# Patient Record
Sex: MALE | Race: WHITE | NOT HISPANIC OR LATINO | Employment: OTHER | URBAN - METROPOLITAN AREA
[De-identification: names, ages, dates, MRNs, and addresses within clinical notes are randomized per-mention and may not be internally consistent; named-entity substitution may affect disease eponyms.]

---

## 2021-05-28 ENCOUNTER — RECORDS - HEALTHEAST (OUTPATIENT)
Dept: ADMINISTRATIVE | Facility: CLINIC | Age: 61
End: 2021-05-28

## 2023-04-30 ENCOUNTER — HOSPITAL ENCOUNTER (INPATIENT)
Facility: CLINIC | Age: 63
LOS: 7 days | Discharge: HOME | DRG: 501 | End: 2023-05-07
Attending: EMERGENCY MEDICINE | Admitting: HOSPITALIST
Payer: COMMERCIAL

## 2023-04-30 ENCOUNTER — APPOINTMENT (OUTPATIENT)
Dept: GENERAL RADIOLOGY | Facility: CLINIC | Age: 63
DRG: 501 | End: 2023-04-30
Attending: EMERGENCY MEDICINE
Payer: COMMERCIAL

## 2023-04-30 DIAGNOSIS — M00.9 PYOGENIC ARTHRITIS OF LEFT SHOULDER REGION, DUE TO UNSPECIFIED ORGANISM (H): ICD-10-CM

## 2023-04-30 LAB
ANION GAP SERPL CALCULATED.3IONS-SCNC: 11 MMOL/L (ref 7–15)
BASOPHILS # BLD AUTO: 0 10E3/UL (ref 0–0.2)
BASOPHILS NFR BLD AUTO: 0 %
BUN SERPL-MCNC: 17.1 MG/DL (ref 8–23)
CALCIUM SERPL-MCNC: 9.2 MG/DL (ref 8.8–10.2)
CHLORIDE SERPL-SCNC: 98 MMOL/L (ref 98–107)
CREAT SERPL-MCNC: 0.95 MG/DL (ref 0.67–1.17)
CRP SERPL-MCNC: 127.71 MG/L
DEPRECATED HCO3 PLAS-SCNC: 29 MMOL/L (ref 22–29)
EOSINOPHIL # BLD AUTO: 0 10E3/UL (ref 0–0.7)
EOSINOPHIL NFR BLD AUTO: 0 %
ERYTHROCYTE [DISTWIDTH] IN BLOOD BY AUTOMATED COUNT: 13.6 % (ref 10–15)
ERYTHROCYTE [SEDIMENTATION RATE] IN BLOOD BY WESTERGREN METHOD: 25 MM/HR (ref 0–20)
GFR SERPL CREATININE-BSD FRML MDRD: 89 ML/MIN/1.73M2
GLUCOSE SERPL-MCNC: 104 MG/DL (ref 70–99)
HCT VFR BLD AUTO: 44.5 % (ref 40–53)
HGB BLD-MCNC: 14.6 G/DL (ref 13.3–17.7)
HOLD SPECIMEN: NORMAL
IMM GRANULOCYTES # BLD: 0.1 10E3/UL
IMM GRANULOCYTES NFR BLD: 0 %
LACTATE SERPL-SCNC: 0.6 MMOL/L (ref 0.7–2)
LACTATE SERPL-SCNC: 1.1 MMOL/L (ref 0.7–2)
LYMPHOCYTES # BLD AUTO: 1.4 10E3/UL (ref 0.8–5.3)
LYMPHOCYTES NFR BLD AUTO: 12 %
MCH RBC QN AUTO: 29.1 PG (ref 26.5–33)
MCHC RBC AUTO-ENTMCNC: 32.8 G/DL (ref 31.5–36.5)
MCV RBC AUTO: 89 FL (ref 78–100)
MONOCYTES # BLD AUTO: 1.3 10E3/UL (ref 0–1.3)
MONOCYTES NFR BLD AUTO: 11 %
NEUTROPHILS # BLD AUTO: 9.1 10E3/UL (ref 1.6–8.3)
NEUTROPHILS NFR BLD AUTO: 77 %
NRBC # BLD AUTO: 0 10E3/UL
NRBC BLD AUTO-RTO: 0 /100
PLATELET # BLD AUTO: 301 10E3/UL (ref 150–450)
POTASSIUM SERPL-SCNC: 3.8 MMOL/L (ref 3.4–5.3)
RBC # BLD AUTO: 5.02 10E6/UL (ref 4.4–5.9)
SODIUM SERPL-SCNC: 138 MMOL/L (ref 136–145)
WBC # BLD AUTO: 11.9 10E3/UL (ref 4–11)

## 2023-04-30 PROCEDURE — 86140 C-REACTIVE PROTEIN: CPT | Performed by: EMERGENCY MEDICINE

## 2023-04-30 PROCEDURE — 120N000001 HC R&B MED SURG/OB

## 2023-04-30 PROCEDURE — 83605 ASSAY OF LACTIC ACID: CPT | Performed by: EMERGENCY MEDICINE

## 2023-04-30 PROCEDURE — 73030 X-RAY EXAM OF SHOULDER: CPT | Mod: LT

## 2023-04-30 PROCEDURE — 80048 BASIC METABOLIC PNL TOTAL CA: CPT | Performed by: EMERGENCY MEDICINE

## 2023-04-30 PROCEDURE — 99222 1ST HOSP IP/OBS MODERATE 55: CPT | Performed by: HOSPITALIST

## 2023-04-30 PROCEDURE — 250N000013 HC RX MED GY IP 250 OP 250 PS 637: Performed by: HOSPITALIST

## 2023-04-30 PROCEDURE — 36415 COLL VENOUS BLD VENIPUNCTURE: CPT | Performed by: EMERGENCY MEDICINE

## 2023-04-30 PROCEDURE — 85652 RBC SED RATE AUTOMATED: CPT | Performed by: EMERGENCY MEDICINE

## 2023-04-30 PROCEDURE — 99285 EMERGENCY DEPT VISIT HI MDM: CPT | Mod: 25

## 2023-04-30 PROCEDURE — 85025 COMPLETE CBC W/AUTO DIFF WBC: CPT | Performed by: EMERGENCY MEDICINE

## 2023-04-30 PROCEDURE — 87040 BLOOD CULTURE FOR BACTERIA: CPT | Performed by: EMERGENCY MEDICINE

## 2023-04-30 RX ORDER — ONDANSETRON 4 MG/1
4 TABLET, ORALLY DISINTEGRATING ORAL EVERY 6 HOURS PRN
Status: DISCONTINUED | OUTPATIENT
Start: 2023-04-30 | End: 2023-05-07 | Stop reason: HOSPADM

## 2023-04-30 RX ORDER — LIDOCAINE 40 MG/G
CREAM TOPICAL
Status: DISCONTINUED | OUTPATIENT
Start: 2023-04-30 | End: 2023-05-07 | Stop reason: HOSPADM

## 2023-04-30 RX ORDER — NALOXONE HYDROCHLORIDE 0.4 MG/ML
0.4 INJECTION, SOLUTION INTRAMUSCULAR; INTRAVENOUS; SUBCUTANEOUS
Status: DISCONTINUED | OUTPATIENT
Start: 2023-04-30 | End: 2023-05-07 | Stop reason: HOSPADM

## 2023-04-30 RX ORDER — AMOXICILLIN 250 MG
2 CAPSULE ORAL 2 TIMES DAILY PRN
Status: DISCONTINUED | OUTPATIENT
Start: 2023-04-30 | End: 2023-05-07 | Stop reason: HOSPADM

## 2023-04-30 RX ORDER — ACETAMINOPHEN 500 MG
1000 TABLET ORAL EVERY 6 HOURS PRN
Status: ON HOLD | COMMUNITY
End: 2023-05-05

## 2023-04-30 RX ORDER — IBUPROFEN 200 MG
400 TABLET ORAL EVERY 6 HOURS PRN
COMMUNITY

## 2023-04-30 RX ORDER — AMOXICILLIN 250 MG
1 CAPSULE ORAL 2 TIMES DAILY PRN
Status: DISCONTINUED | OUTPATIENT
Start: 2023-04-30 | End: 2023-05-07 | Stop reason: HOSPADM

## 2023-04-30 RX ORDER — ONDANSETRON 2 MG/ML
4 INJECTION INTRAMUSCULAR; INTRAVENOUS EVERY 6 HOURS PRN
Status: DISCONTINUED | OUTPATIENT
Start: 2023-04-30 | End: 2023-05-07 | Stop reason: HOSPADM

## 2023-04-30 RX ORDER — ACETAMINOPHEN 650 MG/1
650 SUPPOSITORY RECTAL EVERY 6 HOURS PRN
Status: DISCONTINUED | OUTPATIENT
Start: 2023-04-30 | End: 2023-05-07 | Stop reason: HOSPADM

## 2023-04-30 RX ORDER — ASPIRIN 81 MG/1
81 TABLET ORAL DAILY
Status: ON HOLD | COMMUNITY
End: 2023-05-05

## 2023-04-30 RX ORDER — NALOXONE HYDROCHLORIDE 0.4 MG/ML
0.2 INJECTION, SOLUTION INTRAMUSCULAR; INTRAVENOUS; SUBCUTANEOUS
Status: DISCONTINUED | OUTPATIENT
Start: 2023-04-30 | End: 2023-05-07 | Stop reason: HOSPADM

## 2023-04-30 RX ORDER — LANOLIN ALCOHOL/MO/W.PET/CERES
3 CREAM (GRAM) TOPICAL
Status: DISCONTINUED | OUTPATIENT
Start: 2023-04-30 | End: 2023-05-07 | Stop reason: HOSPADM

## 2023-04-30 RX ORDER — ACETAMINOPHEN 325 MG/1
650 TABLET ORAL EVERY 6 HOURS PRN
Status: DISCONTINUED | OUTPATIENT
Start: 2023-04-30 | End: 2023-05-07 | Stop reason: HOSPADM

## 2023-04-30 RX ADMIN — HYDROMORPHONE HYDROCHLORIDE 1 MG: 2 TABLET ORAL at 23:17

## 2023-04-30 RX ADMIN — ACETAMINOPHEN 650 MG: 325 TABLET ORAL at 23:17

## 2023-04-30 ASSESSMENT — ACTIVITIES OF DAILY LIVING (ADL)
ADLS_ACUITY_SCORE: 35

## 2023-04-30 ASSESSMENT — ENCOUNTER SYMPTOMS
WOUND: 1
JOINT SWELLING: 1
ARTHRALGIAS: 1
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
## 2023-04-30 NOTE — ED PROVIDER NOTES
"  History     Chief Complaint:  Shoulder Pain       HPI   Simone Mercedes is a 63 year old male with a history of osteoarthritis who presents with shoulder pain. The patient states that 5 years ago he was seen by orthopedics and was told that he may need a left shoulder replacement. He opted for physical therapy and cupping and has had limited mobility in the shoulder for years. He also has a drainage on the back of the left shoulder that he can feel and hs been there for three years and never heals. The patient then just this week developed more swelling and pain in the shoulder. He was taking a nap this after noon and he had a redness and warmth to his underarm which scared him and he wanted to come in. He has a meeting with orthopedics tomorrow to get the shoulder replaced but wanted to rule out a septic joint because he had one in the past.      Independent Historian:   None - Patient Only    Review of External Notes: na     ROS:  Review of Systems   Musculoskeletal: Positive for arthralgias and joint swelling.   Skin: Positive for rash and wound.   All other systems reviewed and are negative.      Allergies:  No Known Allergies     Medications:    The patient is currently on no regular medications.    Past Medical History:    Osteoarthritis    Past Surgical History:    Femur ORIF  Right knee replacement  Rotator cuff repair     Family History:    Father-Alcohol abuse  Mother-Arthritis  Sister-Alcohol abuse    Social History:  The patient presents to the ED with his granddaughter.    Physical Exam     Patient Vitals for the past 24 hrs:   BP Temp Temp src Pulse Resp SpO2 Height Weight   04/30/23 2030 (!) 157/92 -- -- -- -- 95 % -- --   04/30/23 1927 (!) 157/94 -- -- 90 18 95 % 1.854 m (6' 1\") 83.9 kg (185 lb)   04/30/23 1800 (!) 144/102 -- -- 87 18 95 % -- --   04/30/23 1740 (!) 161/104 -- -- 91 -- 95 % -- --   04/30/23 1634 (!) 186/96 98.4  F (36.9  C) Temporal 114 16 97 % -- 82 kg (180 lb 12.4 oz)    "     Physical Exam  GENERAL: well developed, pleasant  HEAD: atraumatic  EYES: pupils reactive, extraocular muscles intact, conjunctivae normal  ENT:  mucus membranes moist  NECK:  trachea midline, normal range of motion  RESPIRATORY: no tachypnea, breath sounds clear to auscultation   CVS: normal S1/S2, no murmurs, intact distal pulses  ABDOMEN: soft, nontender, nondistention  MUSCULOSKELETAL: no deformities, limited range of motion to the left shoulder  SKIN: warm and dry, Redness and warmth to the left underarm as seen in photo. Wound in left posterior shoulder as seen in photo  NEURO: GCS 15, cranial nerves intact, alert and oriented x3  PSYCH:  Mood/affect normal                Emergency Department Course     Imaging:  XR Shoulder Left G/E 3 Views   Final Result   IMPRESSION:       Moderate to severe glenohumeral joint space narrowing with areas of sclerosis in the humeral head with adjacent areas of lucency, which could reflect sequelae of chronic degenerative changes, however the acuity cannot be determined and underlying    infection cannot be excluded. Significant narrowing of the acromiohumeral interval suggesting underlying rotator cuff tear.         NOTE: ABNORMAL REPORT      THE DICTATION ABOVE DESCRIBES AN ABNORMALITY FOR WHICH FOLLOW-UP IS NEEDED.           Report per radiology    Laboratory:  Labs Ordered and Resulted from Time of ED Arrival to Time of ED Departure   BASIC METABOLIC PANEL - Abnormal       Result Value    Sodium 138      Potassium 3.8      Chloride 98      Carbon Dioxide (CO2) 29      Anion Gap 11      Urea Nitrogen 17.1      Creatinine 0.95      Calcium 9.2      Glucose 104 (*)     GFR Estimate 89     CBC WITH PLATELETS AND DIFFERENTIAL - Abnormal    WBC Count 11.9 (*)     RBC Count 5.02      Hemoglobin 14.6      Hematocrit 44.5      MCV 89      MCH 29.1      MCHC 32.8      RDW 13.6      Platelet Count 301      % Neutrophils 77      % Lymphocytes 12      % Monocytes 11      %  Eosinophils 0      % Basophils 0      % Immature Granulocytes 0      NRBCs per 100 WBC 0      Absolute Neutrophils 9.1 (*)     Absolute Lymphocytes 1.4      Absolute Monocytes 1.3      Absolute Eosinophils 0.0      Absolute Basophils 0.0      Absolute Immature Granulocytes 0.1      Absolute NRBCs 0.0     CRP INFLAMMATION - Abnormal    CRP Inflammation 127.71 (*)    ERYTHROCYTE SEDIMENTATION RATE AUTO - Abnormal    Erythrocyte Sedimentation Rate 25 (*)    LACTIC ACID WHOLE BLOOD - Abnormal    Lactic Acid 0.6 (*)    LACTIC ACID WHOLE BLOOD - Normal    Lactic Acid 1.1     BLOOD CULTURE   BLOOD CULTURE      Emergency Department Course & Assessments:    Assessments:  1810 Obtained the patients history and performed initial exam  2100 Rechecked the patient and updated him on findings    Independent Interpretation (X-rays, CTs, rhythm strip):  Xray showing no dislocation    Consultations/Discussion of Management or Tests:  ED Course as of 04/30/23 2110   Sun Apr 30, 2023 2055 Spoke to orthopedics about the patients findings and next steps.   2109 I spoke to the hospitalist, Dr. Martinez, who has agreed to admit the patient for continued evaluation and treatment.       Social Determinants of Health affecting care:   None    Disposition:  The patient was admitted to the hospital under the care of Dr. Martinez.     Impression & Plan      Medical Decision Making:  Patient presents with worsening chronic left shoulder pain with prior infection numerous years ago.  He notes 1 week of symptoms and limited ROM.  He has redness to left arm pit with warmth and erythema.  He also notes a chronic wound/opening to posterior left arm/shoulder that he sometimes squeezes in the shower and blood or pus comes out.  His inflammatory markers are elevated.  Cultures are obtained.  I spoke with orthopedics who suggested IR tap, hold antibiotics unless septic appearing and admission.  IR drain for septic joint was ordered.  Spoke with  hospitalist for admission.    Diagnosis:    ICD-10-CM    1. Pyogenic arthritis of left shoulder region, due to unspecified organism (H)  M00.9            Scribe Disclosure:  I, Devon Rodriguez, am serving as a scribe at 6:06 PM on 4/30/2023 to document services personally performed by Nick Laura MD based on my observations and the provider's statements to me.     4/30/2023   Nick Laura MD Adams, Shaun L, MD  05/01/23 0029

## 2023-05-01 ENCOUNTER — APPOINTMENT (OUTPATIENT)
Dept: ULTRASOUND IMAGING | Facility: CLINIC | Age: 63
DRG: 501 | End: 2023-05-01
Attending: PHYSICIAN ASSISTANT
Payer: COMMERCIAL

## 2023-05-01 ENCOUNTER — APPOINTMENT (OUTPATIENT)
Dept: GENERAL RADIOLOGY | Facility: CLINIC | Age: 63
DRG: 501 | End: 2023-05-01
Attending: PHYSICIAN ASSISTANT
Payer: COMMERCIAL

## 2023-05-01 LAB
ALBUMIN SERPL BCG-MCNC: 3.8 G/DL (ref 3.5–5.2)
ALP SERPL-CCNC: 49 U/L (ref 40–129)
ALT SERPL W P-5'-P-CCNC: 16 U/L (ref 10–50)
ANION GAP SERPL CALCULATED.3IONS-SCNC: 16 MMOL/L (ref 7–15)
AST SERPL W P-5'-P-CCNC: 16 U/L (ref 10–50)
BILIRUB SERPL-MCNC: 0.4 MG/DL
BUN SERPL-MCNC: 10.1 MG/DL (ref 8–23)
CALCIUM SERPL-MCNC: 9 MG/DL (ref 8.8–10.2)
CHLORIDE SERPL-SCNC: 101 MMOL/L (ref 98–107)
CREAT SERPL-MCNC: 0.81 MG/DL (ref 0.67–1.17)
DEPRECATED HCO3 PLAS-SCNC: 23 MMOL/L (ref 22–29)
ERYTHROCYTE [DISTWIDTH] IN BLOOD BY AUTOMATED COUNT: 13.6 % (ref 10–15)
GFR SERPL CREATININE-BSD FRML MDRD: >90 ML/MIN/1.73M2
GLUCOSE SERPL-MCNC: 95 MG/DL (ref 70–99)
GRAM STAIN RESULT: NORMAL
GRAM STAIN RESULT: NORMAL
HCT VFR BLD AUTO: 43.7 % (ref 40–53)
HGB BLD-MCNC: 14.4 G/DL (ref 13.3–17.7)
MCH RBC QN AUTO: 29.4 PG (ref 26.5–33)
MCHC RBC AUTO-ENTMCNC: 33 G/DL (ref 31.5–36.5)
MCV RBC AUTO: 89 FL (ref 78–100)
PLATELET # BLD AUTO: 270 10E3/UL (ref 150–450)
POTASSIUM SERPL-SCNC: 3.8 MMOL/L (ref 3.4–5.3)
PROT SERPL-MCNC: 7 G/DL (ref 6.4–8.3)
RBC # BLD AUTO: 4.9 10E6/UL (ref 4.4–5.9)
SODIUM SERPL-SCNC: 140 MMOL/L (ref 136–145)
WBC # BLD AUTO: 10.2 10E3/UL (ref 4–11)

## 2023-05-01 PROCEDURE — 99232 SBSQ HOSP IP/OBS MODERATE 35: CPT | Performed by: STUDENT IN AN ORGANIZED HEALTH CARE EDUCATION/TRAINING PROGRAM

## 2023-05-01 PROCEDURE — 77002 NEEDLE LOCALIZATION BY XRAY: CPT

## 2023-05-01 PROCEDURE — 80053 COMPREHEN METABOLIC PANEL: CPT | Performed by: HOSPITALIST

## 2023-05-01 PROCEDURE — 255N000002 HC RX 255 OP 636: Performed by: HOSPITALIST

## 2023-05-01 PROCEDURE — 250N000011 HC RX IP 250 OP 636: Performed by: STUDENT IN AN ORGANIZED HEALTH CARE EDUCATION/TRAINING PROGRAM

## 2023-05-01 PROCEDURE — 250N000013 HC RX MED GY IP 250 OP 250 PS 637: Performed by: STUDENT IN AN ORGANIZED HEALTH CARE EDUCATION/TRAINING PROGRAM

## 2023-05-01 PROCEDURE — 36415 COLL VENOUS BLD VENIPUNCTURE: CPT | Performed by: HOSPITALIST

## 2023-05-01 PROCEDURE — 20611 DRAIN/INJ JOINT/BURSA W/US: CPT | Mod: LT

## 2023-05-01 PROCEDURE — 250N000013 HC RX MED GY IP 250 OP 250 PS 637: Performed by: HOSPITALIST

## 2023-05-01 PROCEDURE — 120N000001 HC R&B MED SURG/OB

## 2023-05-01 PROCEDURE — 250N000009 HC RX 250: Performed by: HOSPITALIST

## 2023-05-01 PROCEDURE — 250N000011 HC RX IP 250 OP 636: Performed by: HOSPITALIST

## 2023-05-01 PROCEDURE — 258N000003 HC RX IP 258 OP 636: Performed by: HOSPITALIST

## 2023-05-01 PROCEDURE — 87015 SPECIMEN INFECT AGNT CONCNTJ: CPT | Performed by: PHYSICIAN ASSISTANT

## 2023-05-01 PROCEDURE — 250N000009 HC RX 250: Performed by: PHYSICIAN ASSISTANT

## 2023-05-01 PROCEDURE — 87205 SMEAR GRAM STAIN: CPT | Performed by: PHYSICIAN ASSISTANT

## 2023-05-01 PROCEDURE — 0R9K3ZX DRAINAGE OF LEFT SHOULDER JOINT, PERCUTANEOUS APPROACH, DIAGNOSTIC: ICD-10-PCS | Performed by: PHYSICIAN ASSISTANT

## 2023-05-01 PROCEDURE — 89050 BODY FLUID CELL COUNT: CPT | Performed by: PHYSICIAN ASSISTANT

## 2023-05-01 PROCEDURE — 87075 CULTR BACTERIA EXCEPT BLOOD: CPT | Performed by: PHYSICIAN ASSISTANT

## 2023-05-01 PROCEDURE — 85027 COMPLETE CBC AUTOMATED: CPT | Performed by: HOSPITALIST

## 2023-05-01 RX ORDER — VANCOMYCIN HYDROCHLORIDE 1 G/200ML
1000 INJECTION, SOLUTION INTRAVENOUS EVERY 12 HOURS
Status: DISCONTINUED | OUTPATIENT
Start: 2023-05-02 | End: 2023-05-03

## 2023-05-01 RX ORDER — IOPAMIDOL 408 MG/ML
10 INJECTION, SOLUTION INTRATHECAL ONCE
Status: COMPLETED | OUTPATIENT
Start: 2023-05-01 | End: 2023-05-01

## 2023-05-01 RX ORDER — LIDOCAINE HYDROCHLORIDE 10 MG/ML
10 INJECTION, SOLUTION EPIDURAL; INFILTRATION; INTRACAUDAL; PERINEURAL ONCE
Status: COMPLETED | OUTPATIENT
Start: 2023-05-01 | End: 2023-05-01

## 2023-05-01 RX ORDER — TRAMADOL HYDROCHLORIDE 50 MG/1
50 TABLET ORAL EVERY 6 HOURS PRN
Status: DISCONTINUED | OUTPATIENT
Start: 2023-05-01 | End: 2023-05-03

## 2023-05-01 RX ORDER — CEFTRIAXONE 2 G/1
2 INJECTION, POWDER, FOR SOLUTION INTRAMUSCULAR; INTRAVENOUS EVERY 24 HOURS
Status: DISCONTINUED | OUTPATIENT
Start: 2023-05-01 | End: 2023-05-05

## 2023-05-01 RX ADMIN — ACETAMINOPHEN 650 MG: 325 TABLET ORAL at 06:46

## 2023-05-01 RX ADMIN — LIDOCAINE HYDROCHLORIDE 5 ML: 10 INJECTION, SOLUTION EPIDURAL; INFILTRATION; INTRACAUDAL; PERINEURAL at 11:53

## 2023-05-01 RX ADMIN — IOPAMIDOL 8 ML: 408 INJECTION, SOLUTION INTRATHECAL at 12:26

## 2023-05-01 RX ADMIN — TRAMADOL HYDROCHLORIDE 50 MG: 50 TABLET, COATED ORAL at 16:43

## 2023-05-01 RX ADMIN — VANCOMYCIN HYDROCHLORIDE 2000 MG: 10 INJECTION, POWDER, LYOPHILIZED, FOR SOLUTION INTRAVENOUS at 19:44

## 2023-05-01 RX ADMIN — LIDOCAINE HYDROCHLORIDE 5 ML: 10 INJECTION, SOLUTION EPIDURAL; INFILTRATION; INTRACAUDAL; PERINEURAL at 12:36

## 2023-05-01 RX ADMIN — HYDROMORPHONE HYDROCHLORIDE 1 MG: 2 TABLET ORAL at 03:32

## 2023-05-01 RX ADMIN — HYDROMORPHONE HYDROCHLORIDE 1 MG: 2 TABLET ORAL at 08:03

## 2023-05-01 RX ADMIN — CEFTRIAXONE SODIUM 2 G: 2 INJECTION, POWDER, FOR SOLUTION INTRAMUSCULAR; INTRAVENOUS at 16:43

## 2023-05-01 RX ADMIN — ACETAMINOPHEN 650 MG: 325 TABLET ORAL at 19:44

## 2023-05-01 ASSESSMENT — ACTIVITIES OF DAILY LIVING (ADL)
DOING_ERRANDS_INDEPENDENTLY_DIFFICULTY: NO
ADLS_ACUITY_SCORE: 18
WEAR_GLASSES_OR_BLIND: NO
ADLS_ACUITY_SCORE: 18
ADLS_ACUITY_SCORE: 18
CHANGE_IN_FUNCTIONAL_STATUS_SINCE_ONSET_OF_CURRENT_ILLNESS/INJURY: NO
ADLS_ACUITY_SCORE: 18
ADLS_ACUITY_SCORE: 18
FALL_HISTORY_WITHIN_LAST_SIX_MONTHS: NO
WALKING_OR_CLIMBING_STAIRS_DIFFICULTY: NO
ADLS_ACUITY_SCORE: 18
ADLS_ACUITY_SCORE: 18
CONCENTRATING,_REMEMBERING_OR_MAKING_DECISIONS_DIFFICULTY: NO
ADLS_ACUITY_SCORE: 18
TOILETING_ISSUES: NO
ADLS_ACUITY_SCORE: 18
DRESSING/BATHING_DIFFICULTY: NO
DIFFICULTY_EATING/SWALLOWING: NO

## 2023-05-01 NOTE — PLAN OF CARE
Goal Outcome Evaluation:          Pt A&Ox4, VSS on room air, left shoulder pain managed with PRN Dilaudid, PRN Tylenol and Ice. Pain with movement. NPO since mindnight. Ambulates with stand by assist, voiding adequately.

## 2023-05-01 NOTE — PROGRESS NOTES
RADIOLOGY PROCEDURE NOTE  Patient name: Simone Merceeds  MRN: [id number]  : 1960    Pre-procedure diagnosis: Left shoulder pain and swelling  Post-procedure diagnosis: Same    Procedure Date/Time: May 1, 2023  12:54 PM  Procedure: Left shoulder aspiration under XR guidance.  Irrigation needed to get a sample.  Minimal collected.  Contrast showed no apparent communication to the lateral shoulder ulceration.  Took the patient to ultrasound and then scanned the area of the ulceration showing an area of possible fluid collection.  This area was irrigated and a sample was collected as well.  With pressure of the posterior shoulder, bloody fluid was expressed from the ulceration.  Estimated blood loss: None  Specimen(s) collected with description: 2-3 ml of fluid after irrigation from the glenohumeral joint.  1-2 ml of fluid collected after irrigation within the pocket lateral to the deltoid  The patient tolerated the procedure well with no immediate complications.  Significant findings:none    See imaging dictation for procedural details.    Provider name: Javi Alba PA-C  Assistant(s):None
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
## 2023-05-01 NOTE — PROGRESS NOTES
RECEIVING UNIT ED HANDOFF REVIEW    ED Nurse Handoff Report was reviewed by: Marjorie Shane RN on April 30, 2023 at 10:05 PM

## 2023-05-01 NOTE — PROVIDER NOTIFICATION
RN called IR on call MD and let him know that pt was still having small oozing serosanguinous (dark) drainage from aspiration site today and that the dressing appeared to be 70% saturated. MD said to continue to monitor and he will call IR staff that did procedure today and call back if there are any changes. But can apply new dressing and he said that gauze was okay with foam tape to apply pressure.

## 2023-05-01 NOTE — PROGRESS NOTES
[facility]    Medicine Progress Note - Hospitalist Service    Date of Admission:  4/30/2023    Assessment & Plan      Septic arthritis of left Shoulder Suspected?  Patient presenting with a 1 week history of left shoulder pain with swelling and warmth.  Additionally he has a likely sinus tract on the lateral posterior aspect of the shoulder which she reports was draining pus earlier this week.  White count mildly elevated but an elevated CRP.  X-ray of the shoulder could represent chronic degenerative changes versus infection.  Case was discussed with orthopedic surgery in the emergency room who recommended getting a joint aspiration prior to starting antibiotics.  Patient is not septic does not require stat antibiotics at this time.  IR was consulted for arthrocentesis.  -S/P IR drainage Follow synovial fluid testing  -f/u ortho consult  -antibitics vancomycin and ceftixone started  -Pain control as needed     Primary prevention of CAD  The patient takes a baby aspirin for primary prevention.   Hold aspirin           Diet: Combination Diet Regular Diet Adult  NPO per Anesthesia Guidelines for Procedure/Surgery Except for: Meds, Ice Chips    DVT Prophylaxis: Pneumatic Compression Devices  Perdomo Catheter: Not present  Lines: None     Cardiac Monitoring: None  Code Status: Full Code      Clinically Significant Risk Factors Present on Admission                               Disposition Plan      Expected Discharge Date: 05/04/2023                  Nickie Torres MD  Hospitalist Service  [facility]  Securely message with [employer] (more info)  Text page via [employer] Paging/Directory   ______________________________________________________________________    Interval History   Pain well controlled  Nauseous after dilaudid use and wanted to change to tramadol which he has tolerated in the past   Physical Exam   Vital Signs: Temp: 98.3  F (36.8  C) Temp src: Oral  BP: (!) 151/85 Pulse: 80   Resp: 18 SpO2: 96 % O2 Device: None (Room air)    Weight: 185 lbs 0 oz    Medical Decision Making       Data     I have personally reviewed the following data over the past 24 hrs:    10.2  \   14.4   / 270     140 101 10.1 /  95   3.8 23 0.81 \       ALT: 16 AST: 16 AP: 49 TBILI: 0.4   ALB: 3.8 TOT PROTEIN: 7.0 LIPASE: N/A       Procal: N/A CRP: N/A Lactic Acid: 0.6 (L)         Imaging results reviewed over the past 24 hrs:   Recent Results (from the past 24 hour(s))   XR Shoulder Left G/E 3 Views    Narrative    EXAM: XR SHOULDER LEFT G/E 3 VIEWS  LOCATION: [facility]  DATE/TIME: 4/30/2023 6:48 PM CDT    INDICATION: Pain. Chronic nonhealing wound.  COMPARISON: None.      Impression    IMPRESSION:     Moderate to severe glenohumeral joint space narrowing with areas of sclerosis in the humeral head with adjacent areas of lucency, which could reflect sequelae of chronic degenerative changes, however the acuity cannot be determined and underlying   infection cannot be excluded. Significant narrowing of the acromiohumeral interval suggesting underlying rotator cuff tear.      NOTE: ABNORMAL REPORT    THE DICTATION ABOVE DESCRIBES AN ABNORMALITY FOR WHICH FOLLOW-UP IS NEEDED.
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
## 2023-05-01 NOTE — PHARMACY-ADMISSION MEDICATION HISTORY
Pharmacy Intern Admission Medication History    Admission medication history is complete. The information provided in this note is only as accurate as the sources available at the time of the update.    Medication reconciliation/reorder completed by provider prior to medication history? No    Information Source(s): Patient, Hospital records and CareEverywhere/SureScripts via in-person    Pertinent Information: Per patient he has been taking two tylenol and two ibuprofen every 2-2.5 hours due to the pain in his left shoulder.     Changes made to PTA medication list:    Added: Added all 3 medications on the list    Deleted: None    Changed: None    Medication Affordability: No concerns, patient only takes OTC medication PRN       Allergies reviewed with patient and updates made in EHR: yes    Medication History Completed By: tJ Singleton 4/30/2023 9:20 PM    Prior to Admission medications    Medication Sig Last Dose Taking? Auth Provider Long Term End Date   acetaminophen (TYLENOL) 500 MG tablet Take 1,000 mg by mouth every 6 hours as needed for mild pain 4/30/2023 at 4 pm Yes Unknown, Entered By History     aspirin 81 MG EC tablet Take 81 mg by mouth daily 4/30/2023 at 4pm Yes Unknown, Entered By History     ibuprofen (ADVIL/MOTRIN) 200 MG tablet Take 400 mg by mouth every 6 hours as needed for pain 4/30/2023 at 4 pm Yes Unknown, Entered By History

## 2023-05-01 NOTE — PROVIDER NOTIFICATION
"RN called [facility]  There was not enough fluid collected to do the \"crystal and body fluid count\", She verbalized understanding.   "

## 2023-05-02 ENCOUNTER — APPOINTMENT (OUTPATIENT)
Dept: MRI IMAGING | Facility: CLINIC | Age: 63
DRG: 501 | End: 2023-05-02
Attending: STUDENT IN AN ORGANIZED HEALTH CARE EDUCATION/TRAINING PROGRAM
Payer: COMMERCIAL

## 2023-05-02 LAB
CREAT SERPL-MCNC: 0.74 MG/DL (ref 0.67–1.17)
CRP SERPL-MCNC: 140.07 MG/L
GFR SERPL CREATININE-BSD FRML MDRD: >90 ML/MIN/1.73M2

## 2023-05-02 PROCEDURE — 36415 COLL VENOUS BLD VENIPUNCTURE: CPT | Performed by: HOSPITALIST

## 2023-05-02 PROCEDURE — 250N000013 HC RX MED GY IP 250 OP 250 PS 637: Performed by: HOSPITALIST

## 2023-05-02 PROCEDURE — 99232 SBSQ HOSP IP/OBS MODERATE 35: CPT | Performed by: STUDENT IN AN ORGANIZED HEALTH CARE EDUCATION/TRAINING PROGRAM

## 2023-05-02 PROCEDURE — 250N000013 HC RX MED GY IP 250 OP 250 PS 637: Performed by: STUDENT IN AN ORGANIZED HEALTH CARE EDUCATION/TRAINING PROGRAM

## 2023-05-02 PROCEDURE — 73223 MRI JOINT UPR EXTR W/O&W/DYE: CPT | Mod: LT

## 2023-05-02 PROCEDURE — 82565 ASSAY OF CREATININE: CPT | Performed by: HOSPITALIST

## 2023-05-02 PROCEDURE — 86140 C-REACTIVE PROTEIN: CPT | Performed by: PHYSICIAN ASSISTANT

## 2023-05-02 PROCEDURE — 250N000011 HC RX IP 250 OP 636: Performed by: STUDENT IN AN ORGANIZED HEALTH CARE EDUCATION/TRAINING PROGRAM

## 2023-05-02 PROCEDURE — 120N000001 HC R&B MED SURG/OB

## 2023-05-02 PROCEDURE — 250N000011 HC RX IP 250 OP 636: Performed by: HOSPITALIST

## 2023-05-02 PROCEDURE — 73221 MRI JOINT UPR EXTREM W/O DYE: CPT | Mod: LT

## 2023-05-02 RX ORDER — LORAZEPAM 1 MG/1
1 TABLET ORAL EVERY 4 HOURS PRN
Status: DISPENSED | OUTPATIENT
Start: 2023-05-02 | End: 2023-05-03

## 2023-05-02 RX ORDER — TRANEXAMIC ACID 10 MG/ML
1 INJECTION, SOLUTION INTRAVENOUS ONCE
Status: DISCONTINUED | OUTPATIENT
Start: 2023-05-02 | End: 2023-05-03 | Stop reason: HOSPADM

## 2023-05-02 RX ORDER — CEFAZOLIN SODIUM/WATER 2 G/20 ML
2 SYRINGE (ML) INTRAVENOUS SEE ADMIN INSTRUCTIONS
Status: DISCONTINUED | OUTPATIENT
Start: 2023-05-02 | End: 2023-05-03 | Stop reason: HOSPADM

## 2023-05-02 RX ORDER — CEFAZOLIN SODIUM/WATER 2 G/20 ML
2 SYRINGE (ML) INTRAVENOUS
Status: COMPLETED | OUTPATIENT
Start: 2023-05-02 | End: 2023-05-03

## 2023-05-02 RX ADMIN — VANCOMYCIN HYDROCHLORIDE 1000 MG: 1 INJECTION, SOLUTION INTRAVENOUS at 08:00

## 2023-05-02 RX ADMIN — CEFTRIAXONE SODIUM 2 G: 2 INJECTION, POWDER, FOR SOLUTION INTRAMUSCULAR; INTRAVENOUS at 16:01

## 2023-05-02 RX ADMIN — LORAZEPAM 1 MG: 1 TABLET ORAL at 22:01

## 2023-05-02 RX ADMIN — TRAMADOL HYDROCHLORIDE 50 MG: 50 TABLET, COATED ORAL at 16:00

## 2023-05-02 RX ADMIN — ACETAMINOPHEN 650 MG: 325 TABLET ORAL at 16:00

## 2023-05-02 RX ADMIN — TRAMADOL HYDROCHLORIDE 50 MG: 50 TABLET, COATED ORAL at 23:41

## 2023-05-02 RX ADMIN — ACETAMINOPHEN 650 MG: 325 TABLET ORAL at 23:41

## 2023-05-02 RX ADMIN — ACETAMINOPHEN 650 MG: 325 TABLET ORAL at 08:55

## 2023-05-02 RX ADMIN — TRAMADOL HYDROCHLORIDE 50 MG: 50 TABLET, COATED ORAL at 08:55

## 2023-05-02 RX ADMIN — TRAMADOL HYDROCHLORIDE 50 MG: 50 TABLET, COATED ORAL at 02:48

## 2023-05-02 RX ADMIN — VANCOMYCIN HYDROCHLORIDE 1000 MG: 1 INJECTION, SOLUTION INTRAVENOUS at 20:06

## 2023-05-02 RX ADMIN — ACETAMINOPHEN 650 MG: 325 TABLET ORAL at 02:49

## 2023-05-02 ASSESSMENT — ACTIVITIES OF DAILY LIVING (ADL)
ADLS_ACUITY_SCORE: 18

## 2023-05-02 NOTE — TREATMENT PLAN
Left shoulder aspiration:    Not enough fluid for crystals or cell count.     Gram stain negative    Plan:  No surgical intervention today  Will follow cultures  Patient will be seen later today.

## 2023-05-02 NOTE — PROGRESS NOTES
"Pt A&Ox4; VSS on RA. Pain managed with current regimen. Independent in room. SL between IV abx. Redness and swelling noted under L shoulder; received a phone call from on-call MD and is aware. Writer received a call from lab d/t not enough specimen to do \"crystal and cell count\"; same message was relied to [facility] by previous nurse and is aware. Pt has been NPO since midnight for possible surgery today.   "

## 2023-05-02 NOTE — PROGRESS NOTES
Orthopedic Surgery  Simone Mercedes  05/02/2023     Admit Date:  4/30/2023  Left shoulder pain, possible infection     Patient resting comfortably in bed.    Pain controlled.  Tolerating oral intake.    Denies nausea or vomiting  Denies chest pain or shortness of breath    Temp:  [98.1  F (36.7  C)-98.4  F (36.9  C)] 98.2  F (36.8  C)  Pulse:  [75-85] 75  Resp:  [18] 18  BP: (136-154)/(74-85) 136/77  SpO2:  [96 %-97 %] 97 %    On exam of the left shoulder: There is increased erythema and swelling along the inferior aspect of the left shoulder/axilla region.   He does have a small scabbed over wound to the posterior aspect of the left shoulder that does have mild drainage.  Patient has very limited range of motion with forward flexion to approximately 40 degrees very limited external rotation to 10 degrees.  Has pain at the end range of motion.  Moderate left shoulder joint effusion present.  Patient denies pain with range of motion of his right upper extremity, left elbow left wrist.  Patient has equal sensation to light touch in the left versus right upper extremity and distal pulses are intact and equal bilaterally.    Labs:  Recent Labs   Lab Test 05/01/23  0740 04/30/23  1647   WBC 10.2 11.9*   HGB 14.4 14.6    301     No lab results found.  Recent Labs   Lab Test 04/30/23  1647   CRPI 127.71*     Aspiration:   Not enough fluid for crystals or cell count.    Gram stain negative    1. PLAN:   MRI of the left shoulder ordered to evaluate the inferior fluid collection/swelling   Continue IV ABx   NPO midnight   Possible I&D tomorrow pending MR results    2. Disposition   Continue cares     Radha Aguayo PA-C

## 2023-05-02 NOTE — PLAN OF CARE
Goal Outcome Evaluation:  A&OX4, VSS on RA, Pain in the left shoulder managed with tramadol and tylenol. Limited ROM to that shoulder. L shoulder warm to touch and erythematous. MRI of the L shoulder pending. Regular diet. NPO after midnight for possible I&D tomorrow pending MRI results. Up ind. PIV Sled. On Vanco and ceftriaxone. Continue to monitor.

## 2023-05-02 NOTE — PLAN OF CARE
Problem: Plan of Care - These are the overarching goals to be used throughout the patient stay.    Goal: Plan of Care Review  Description: The Plan of Care Review/Shift note should be completed every shift.  The Outcome Evaluation is a brief statement about your assessment that the patient is improving, declining, or no change.  This information will be displayed automatically on your shift note.  Outcome: Progressing     Problem: Pain Acute  Goal: Optimal Pain Control and Function  Outcome: Progressing  Intervention: Develop Pain Management Plan  Recent Flowsheet Documentation  Taken 5/2/2023 0955 by Meri Cramer, RN  Pain Management Interventions: cold applied  Taken 5/2/2023 0855 by Meri Cramer, RN  Pain Management Interventions: medication (see MAR)  Intervention: Prevent or Manage Pain  Recent Flowsheet Documentation  Taken 5/2/2023 0800 by Meri Cramer, RN  Medication Review/Management: medications reviewed   Goal Outcome Evaluation:       Patient complaining of pain in the left shoulder/axillary area.  PRN pain medication administered.  Left axillary is warm, red, and firm to the touch.  Ortho surgery is following, no plan for surgical intervention today.  Md to see patient later today to go over plan.

## 2023-05-02 NOTE — PROVIDER NOTIFICATION
"2410 MS  Pt complaining that the L anterior armpit seems more \"red and swollen\" compared to yesterday. Pt had aspiration today on site, possible surgery tomorrow. CMS intact. Please advise, thanks  Beverly [phone]    To Dr Cardona via [facility] at 2000  "

## 2023-05-02 NOTE — PROGRESS NOTES
After Visit Summary   3/5/2018    Amos Walker    MRN: [id number]           Patient Information     Date Of Birth          1948        Visit Information        Provider Department      3/5/2018 11:00 AM Racheal Swift MD [facility]        Today's Diagnoses     Screen for colon cancer    -  1    Anemia, unspecified type        Type 2 diabetes, controlled, with neuropathy (H)          Care Instructions    Primary Care Center: [phone]     Primary Care Center Medication Refill Request Information:  * Please contact your pharmacy regarding ANY request for medication refills.  ** [facility] Prescription Fax = [phone]  * Please allow 3 business days for routine medication refills.  * Please allow 5 business days for controlled substance medication refills.     Primary Care Center Test Result notification information:  *You will be notified with in 7-10 days of your appointment day regarding the results of your test.  If you are on MyChart you will be notified as soon as the provider has reviewed the results and signed off on them.      Cardiovascular [phone] ([facility] [place] 3rd [facility] S)                Follow-ups after your visit        Follow-up notes from your care team     Return in about 2 weeks (around 3/19/2018) for Routine Visit, f/u labs.      Your next 10 appointments already scheduled     Mar 05, 2018 12:45 PM CST   LAB with  LAB   [facility] ([facility])    [address] [place] [address] [phone]           Please do not eat 10-12 hours before your appointment if you are coming in fasting for labs on lipids, cholesterol, or glucose (sugar). This does not apply to pregnant women. Water, hot tea and black coffee (with nothing added) are okay. Do not drink other fluids, diet soda or chew gum.            Mar 06, 2018 10:00 AM CST   Return Visit with Brayan Mcclain DPM   [facility] ([facility]  [facility]    Medicine Progress Note - Hospitalist Service    Date of Admission:  4/30/2023    Assessment & Plan      Septic arthritis of left Shoulder Suspected ?  Patient presenting with a 1 week history of left shoulder pain with swelling and warmth.  Additionally he has a likely sinus tract on the lateral posterior aspect of the shoulder which she reports was draining pus earlier this week.  White count mildly elevated but an elevated CRP.  X-ray of the shoulder could represent chronic degenerative changes versus infection.  Case was discussed with orthopedic surgery in the emergency room who recommended getting a joint aspiration prior to starting antibiotics.  Patient is not septic does not require stat antibiotics at this time.  IR was consulted for arthrocentesis.  -S/P IR athrocentesis on 5/1/23 Follow synovial fluid testing  -f/u ortho consult  -antibitics vancomycin and ceftixone started on 5/1/23  -Not enough fluid for cell count and cultures   -Pain control as needed     Primary prevention of CAD  The patient takes a baby aspirin for primary prevention.   Hold aspirin           Diet: Regular Diet Adult  NPO per Anesthesia Guidelines for Procedure/Surgery Except for: Meds, Ice Chips    DVT Prophylaxis: Pneumatic Compression Devices  Perdomo Catheter: Not present  Lines: None     Cardiac Monitoring: None  Code Status: Full Code      Clinically Significant Risk Factors                                 Disposition Plan      Expected Discharge Date: 05/04/2023        Discharge Comments: Possible surgery          Nickie Torres MD  Hospitalist Service  [facility]  Securely message with [employer] (more info)  Text page via [employer] Paging/Directory   ______________________________________________________________________    Interval History   Pain well controlled  Feels tired and fatigued  No chest pain or shortness of breath  Physical Exam   Vital Signs:  [facility])    [address] [facility] [address]   [phone]            Mar 13, 2018  9:30 AM CDT   [facility]    [address] [facility] [address]   [phone]            Mar 13, 2018   Procedure with Anita Stock MD   [facility] [place] (--)    [address] [facility] [address]   [phone]            Mar 20, 2018 10:00 AM CDT   [facility]   [facility]    [address] [facility] [address]   [phone]            Mar 27, 2018  9:30 AM CDT   [facility]   [facility]    [address] [facility] [address]   [phone]            Apr 27, 2018 10:25 AM CDT   (Arrive by 10:10 AM)   Return Visit with Rcaheal Swift MD   [facility] ([facility])    [address]  [address] [facility] [id number] [phone]            Jun 20, 2018 10:30 AM CDT   RETURN RETINA with Jen Aranda MD   [facility] ([facility])    [address]  [address] [address] [place] [address] [phone]              Future tests that were ordered for you today     Open Future Orders        Priority Expected Expires Ordered    Albumin Random Urine Quantitative with Creat Ratio Routine 3/5/2018 3/5/2019 3/5/2018    TSH WITH FREE T4 REFLEX - (Today) Routine 3/5/2018 3/5/2019 3/5/2018    Fecal colorectal cancer screen FIT - Future (S+30) Routine 3/26/2018 4/4/2018 3/5/2018    Blood Morphology Pathologist Review Routine 3/5/2018 3/5/2019 3/5/2018    CBC with platelets differential Routine 3/5/2018 3/5/2019 3/5/2018    Reticulocyte Count Routine 3/5/2018 3/5/2019 3/5/2018    Haptoglobin Routine 3/5/2018  Temp: 98.2  F (36.8  C) Temp src: Oral BP: 136/77 Pulse: 75   Resp: 18 SpO2: 97 % O2 Device: None (Room air)    Weight: 185 lbs 0 oz  Physical Exam  Cardiovascular:      Rate and Rhythm: Normal rate and regular rhythm.   Pulmonary:      Effort: Pulmonary effort is normal. No respiratory distress.      Breath sounds: Normal breath sounds.   Abdominal:      General: There is no distension.      Palpations: Abdomen is soft.   Musculoskeletal:      Comments: Left shoulder warm,swollen and tender       Medical Decision Making       Data     I have personally reviewed the following data over the past 24 hrs:    N/A  \   N/A   / N/A     N/A N/A N/A /  N/A   N/A N/A 0.74 \       Procal: N/A CRP: 140.07 (H) Lactic Acid: N/A            3/19/2018 3/5/2018    Lactate Dehydrogenase Routine 3/5/2018 3/5/2019 3/5/2018    Iron and iron binding capacity Routine  3/6/2019 3/5/2018    Ferritin Routine 3/5/2018 3/5/2019 3/5/2018    Vitamin B12 Routine 3/5/2018 3/5/2019 3/5/2018    Creatinine Routine 3/5/2018 3/5/2019 3/5/2018            Who to contact     Please call your clinic at [phone] to:    Ask questions about your health    Make or cancel appointments    Discuss your medicines    Learn about your test results    Speak to your doctor            Additional Information About Your Visit        [employer] Information     [employer] gives you secure access to your electronic health record. If you see a primary care provider, you can also send messages to your care team and make appointments. If you have questions, please call your primary care clinic.  If you do not have a primary care provider, please call [phone] and they will assist you.      [employer] is an electronic gateway that provides easy, online access to your medical records. With [employer], you can request a clinic appointment, read your test results, renew a prescription or communicate with your care team.     To access your existing account, please contact your [facility] or call [phone] for assistance.        Care EveryWhere ID     This is your Care EveryWhere ID. This could be used by other organizations to access your [facility] medical records  [id number]        Your Vitals Were     Pulse Respirations Pulse Oximetry BMI (Body Mass Index)          99 20 99% 21.66 kg/m2         Blood Pressure from Last 3 Encounters:   03/05/18 125/67   03/02/18 103/53   02/27/18 118/66    Weight from Last 3 Encounters:   03/05/18 77.6 kg (171 lb)   03/01/18 77.6 kg (171 lb 1.2 oz)   02/21/18 77.6 kg (171 lb 2 oz)                 Today's Medication Changes          These changes are accurate as of 3/5/18 12:22 PM.  If you have any questions, ask your nurse or doctor.                These medicines have changed or have updated prescriptions.        Dose/Directions    clopidogrel 75 MG tablet   Commonly known as:  PLAVIX   This may have changed:  when to take this   Used for:  Peripheral vascular disease, unspecified        Dose:  75 mg   Take 1 tablet (75 mg) by mouth daily   Quantity:  30 tablet   Refills:  11       gentamicin 0.1 % cream   Commonly known as:  GARAMYCIN   This may have changed:    - when to take this  - reasons to take this  - additional instructions   Used for:  Ulcer of right lower leg, with fat layer exposed (H), Chronic venous hypertension with ulcer involving right side (H), Type 2 diabetes, controlled, with neuropathy (H)        Apply topically daily To right leg ulcer.   Quantity:  30 g   Refills:  5       insulin glargine 100 UNIT/ML injection   Commonly known as:  LANTUS SOLOSTAR   This may have changed:  when to take this   Used for:  Type 2 diabetes mellitus with diabetic peripheral angiopathy without gangrene, with long-term current use of insulin (H)        Dose:  25 Units   Inject 25 Units Subcutaneous At Bedtime   Quantity:  15 mL   Refills:  2                Primary Care Provider Office Phone # Fax #    Racheal Swift [phone] [address] [address]        Equal Access to Services     SAMSON MELTON AH: Nataliia Bedoya, wajunitoda andrae, qaybta kaalmada jose d, yolanda lopez. So [facility] [phone].    ATENCIÓN: Si habla español, tiene a rodrigues disposición servicios gratuitos de asistencia lingüística. [facility] [phone].    We comply with applicable federal civil rights laws and [place] laws. We do not discriminate on the basis of race, color, national origin, age, disability, sex, sexual orientation, or gender identity.            Thank you!     Thank you for choosing [facility]  for your care. Our goal is always to provide you with  excellent care. Hearing back from our patients is one way we can continue to improve our services. Please take a few minutes to complete the written survey that you may receive in the mail after your visit with us. Thank you!             Your Updated Medication List - Protect others around you: Learn how to safely use, store and throw away your medicines at www.disposemymeds.org.          This list is accurate as of 3/5/18 12:22 PM.  Always use your most recent med list.                   Brand Name Dispense Instructions for use Diagnosis    ammonium lactate 12 % cream    LAC-HYDRIN    385 g    Apply topically 2 times daily as needed for dry skin    Venous stasis, Type 2 diabetes, controlled, with neuropathy (H)       ascorbic acid 500 MG Tabs     30 tablet    Take 1 tablet (500 mg) by mouth 2 times daily    Ulcer of right lower leg, with fat layer exposed (H)       ASPIRIN PO      Take 81 mg by mouth daily        blood glucose monitoring lancets     3 Box    Use to test blood sugars 2 as directed.    Type 2 diabetes, uncontrolled, with neuropathy (H)       blood glucose monitoring test strip    ONETOUCH ULTRA    60 strip    Use to test blood sugars 2 times daily or as directed.    Type 2 diabetes mellitus (H)       cephALEXin 500 MG capsule    KEFLEX    28 capsule    Take 1 capsule (500 mg) by mouth 2 times daily    Ulcer of right lower extremity with fat layer exposed (H), Skin ulcer of right foot with fat layer exposed (H), Type II or unspecified type diabetes mellitus with neurological manifestations, not stated as uncontrolled(250.60) (H), Diabetes mellitus with peripheral vascular disease (H)       clopidogrel 75 MG tablet    PLAVIX    30 tablet    Take 1 tablet (75 mg) by mouth daily    Peripheral vascular disease, unspecified       COLACE PO      Take 100 mg by mouth daily        ferrous sulfate 325 (65 FE) MG tablet    IRON    60 tablet    Take 1 tablet (325 mg) by mouth 2 times daily    Peripheral vascular  "disease, unspecified       gentamicin 0.1 % cream    GARAMYCIN    30 g    Apply topically daily To right leg ulcer.    Ulcer of right lower leg, with fat layer exposed (H), Chronic venous hypertension with ulcer involving right side (H), Type 2 diabetes, controlled, with neuropathy (H)       insulin glargine 100 UNIT/ML injection    LANTUS SOLOSTAR    15 mL    Inject 25 Units Subcutaneous At Bedtime    Type 2 diabetes mellitus with diabetic peripheral angiopathy without gangrene, with long-term current use of insulin (H)       insulin pen needle 31G X 8 MM    B-D U/F    100 each    Use 1 daily o as directed    Diabetes mellitus, type II (H)       LISINOPRIL PO      Take 20 mg by mouth 2 times daily        nateglinide 120 MG tablet    STARLIX    90 tablet    TAKE 1 TABLET BY MOUTH THREE TIMES DAILY BEFORE MEALS    Type 2 diabetes, controlled, with neuropathy (H)       OPTIFOAM 6\"X6\" Pads     1 each    1 Box once a week    Ulcer of right leg, with fat layer exposed (H)       * order for DME     2 each    Please measure and distribute 1 pair of 20mm Hg - 30mm Hg knee high ULCER CARE open or closed toe compression stockings.  Patient has a size 13 foot and please take this into consideration.  Jobst or equivalent    Varicose veins of lower extremities with other complications, Venous stasis ulcer of right lower extremity (H)       * order for DME     3 each    Please measure and distribute 1 pair of 20mmHg - 30mmHg knee high open or closed toe compression stockings. Jobst ultrasheer or equivalent.    Varicose veins of both lower extremities with complications       * order for DME     2 each    Please measure and distribute 1 pair of 30mmHg - 40mmHg knee high open toe ulcercare compression stockings. Jobst ultrasheer or equivalent.    Varicose veins of bilateral lower extremities with other complications       sildenafil 50 MG tablet    VIAGRA    10 tablet    Take 1 tablet (50 mg) by mouth daily as needed for erectile " dysfunction    Vasculogenic erectile dysfunction, unspecified vasculogenic erectile dysfunction type       silver sulfADIAZINE 1 % cream    SILVADENE    85 g    Apply topically daily To affected areas on right foot and leg.    Ulcer of right lower leg, with fat layer exposed (H), Chronic venous hypertension with ulcer involving right side (H), Type 2 diabetes, controlled, with neuropathy (H)       simvastatin 10 MG tablet    ZOCOR    90 tablet    Take 1 tablet (10 mg) by mouth At Bedtime    Type 2 diabetes, controlled, with neuropathy (H)       * sitagliptin 100 MG tablet    JANUVIA    90 tablet    Take 1 tablet (100 mg) by mouth daily    Type 2 diabetes, controlled, with neuropathy (H)       * sitagliptin 100 MG tablet    JANUVIA    90 tablet    Take 1 tablet (100 mg) by mouth daily    Type 2 diabetes, HbA1c goal < 7% (H)       triamcinolone 0.1 % cream    KENALOG    30 g    Apply sparingly to left heel daily.    Dermatitis of left foot       VITAMIN C PO      Take 1,000 mg by mouth        VITAMIN D (CHOLECALCIFEROL) PO      Take 1,000 Units by mouth 2 times daily        * Notice:  This list has 5 medication(s) that are the same as other medications prescribed for you. Read the directions carefully, and ask your doctor or other care provider to review them with you.

## 2023-05-02 NOTE — PHARMACY-VANCOMYCIN DOSING SERVICE
Pharmacy Vancomycin Initial Note  Date of Service May 1, 2023  Patient's  1960  63 year old, male    Indication: Bone and Joint Infection    Current estimated CrCl = Estimated Creatinine Clearance: 109.3 mL/min (based on SCr of 0.81 mg/dL).    Creatinine for last 3 days  2023:  4:47 PM Creatinine 0.95 mg/dL  2023:  7:40 AM Creatinine 0.81 mg/dL    Recent Vancomycin Level(s) for last 3 days  No results found for requested labs within last 3 days.      Vancomycin IV Administrations (past 72 hours)                   vancomycin (VANCOCIN) 2,000 mg in 0.9% NaCl 500 mL intermittent infusion (mg) 2,000 mg New Bag 23                Nephrotoxins and other renal medications (From now, onward)    Start     Dose/Rate Route Frequency Ordered Stop    23 0800  vancomycin (VANCOCIN) 1000 mg in dextrose 5% 200 mL PREMIX         1,000 mg  200 mL/hr over 1 Hours Intravenous EVERY 12 HOURS 23 1800  vancomycin (VANCOCIN) 2,000 mg in 0.9% NaCl 500 mL intermittent infusion         2,000 mg  over 2 Hours Intravenous ONCE 23 1756            Contrast Orders - past 72 hours (72h ago, onward)    Start     Dose/Rate Route Frequency Stop    23 1130  iopamidol (ISOVUE-M 200) solution 10 mL         10 mL INTRA-ARTICULAR ONCE 23 1226          [employer] Prediction of Planned Initial Vancomycin Regimen  Loading dose: 2000mg IV x 1   Regimen: 1000 mg IV every 12 hours.  Start time: 07:44 on 2023  Exposure target: AUC24 (range)400-600 mg/L.hr   AUC24,ss: 475 mg/L.hr  Probability of AUC24 > 400: 68 %  Ctrough,ss: 14.9 mg/L  Probability of Ctrough,ss > 20: 26 %  Probability of nephrotoxicity (Lodise LINUS ): 10 %          Plan:  1. Start vancomycin  1000 mg IV q12h.   2. Vancomycin monitoring method: AUC  3. Vancomycin therapeutic monitoring goal: 400-600 mg*h/L  4. Pharmacy will check vancomycin levels as appropriate in 1-3 Days.    5. Serum creatinine levels will be  ordered daily for the first week of therapy and at least twice weekly for subsequent weeks.      Felicity Rivas, [facility]

## 2023-05-03 ENCOUNTER — ANESTHESIA EVENT (OUTPATIENT)
Dept: SURGERY | Facility: CLINIC | Age: 63
DRG: 501 | End: 2023-05-03
Payer: COMMERCIAL

## 2023-05-03 ENCOUNTER — ANESTHESIA (OUTPATIENT)
Dept: SURGERY | Facility: CLINIC | Age: 63
DRG: 501 | End: 2023-05-03
Payer: COMMERCIAL

## 2023-05-03 LAB
CREAT SERPL-MCNC: 0.74 MG/DL (ref 0.67–1.17)
GFR SERPL CREATININE-BSD FRML MDRD: >90 ML/MIN/1.73M2
GRAM STAIN RESULT: ABNORMAL
GRAM STAIN RESULT: NORMAL
VANCOMYCIN SERPL-MCNC: 5.3 UG/ML

## 2023-05-03 PROCEDURE — 258N000001 HC RX 258: Performed by: STUDENT IN AN ORGANIZED HEALTH CARE EDUCATION/TRAINING PROGRAM

## 2023-05-03 PROCEDURE — 120N000001 HC R&B MED SURG/OB

## 2023-05-03 PROCEDURE — 250N000011 HC RX IP 250 OP 636: Performed by: STUDENT IN AN ORGANIZED HEALTH CARE EDUCATION/TRAINING PROGRAM

## 2023-05-03 PROCEDURE — 250N000013 HC RX MED GY IP 250 OP 250 PS 637: Performed by: HOSPITALIST

## 2023-05-03 PROCEDURE — 258N000003 HC RX IP 258 OP 636

## 2023-05-03 PROCEDURE — 255N000002 HC RX 255 OP 636: Performed by: HOSPITALIST

## 2023-05-03 PROCEDURE — 99232 SBSQ HOSP IP/OBS MODERATE 35: CPT | Performed by: STUDENT IN AN ORGANIZED HEALTH CARE EDUCATION/TRAINING PROGRAM

## 2023-05-03 PROCEDURE — 250N000025 HC SEVOFLURANE, PER MIN: Performed by: STUDENT IN AN ORGANIZED HEALTH CARE EDUCATION/TRAINING PROGRAM

## 2023-05-03 PROCEDURE — 370N000017 HC ANESTHESIA TECHNICAL FEE, PER MIN: Performed by: STUDENT IN AN ORGANIZED HEALTH CARE EDUCATION/TRAINING PROGRAM

## 2023-05-03 PROCEDURE — 999N000141 HC STATISTIC PRE-PROCEDURE NURSING ASSESSMENT: Performed by: STUDENT IN AN ORGANIZED HEALTH CARE EDUCATION/TRAINING PROGRAM

## 2023-05-03 PROCEDURE — 250N000013 HC RX MED GY IP 250 OP 250 PS 637

## 2023-05-03 PROCEDURE — 258N000003 HC RX IP 258 OP 636: Performed by: HOSPITALIST

## 2023-05-03 PROCEDURE — 87077 CULTURE AEROBIC IDENTIFY: CPT | Performed by: STUDENT IN AN ORGANIZED HEALTH CARE EDUCATION/TRAINING PROGRAM

## 2023-05-03 PROCEDURE — 82565 ASSAY OF CREATININE: CPT | Performed by: HOSPITALIST

## 2023-05-03 PROCEDURE — 87176 TISSUE HOMOGENIZATION CULTR: CPT | Performed by: STUDENT IN AN ORGANIZED HEALTH CARE EDUCATION/TRAINING PROGRAM

## 2023-05-03 PROCEDURE — 0KB80ZZ EXCISION OF LEFT UPPER ARM MUSCLE, OPEN APPROACH: ICD-10-PCS | Performed by: STUDENT IN AN ORGANIZED HEALTH CARE EDUCATION/TRAINING PROGRAM

## 2023-05-03 PROCEDURE — 710N000009 HC RECOVERY PHASE 1, LEVEL 1, PER MIN: Performed by: STUDENT IN AN ORGANIZED HEALTH CARE EDUCATION/TRAINING PROGRAM

## 2023-05-03 PROCEDURE — 250N000011 HC RX IP 250 OP 636: Performed by: NURSE ANESTHETIST, CERTIFIED REGISTERED

## 2023-05-03 PROCEDURE — 272N000001 HC OR GENERAL SUPPLY STERILE: Performed by: STUDENT IN AN ORGANIZED HEALTH CARE EDUCATION/TRAINING PROGRAM

## 2023-05-03 PROCEDURE — 250N000011 HC RX IP 250 OP 636: Performed by: ANESTHESIOLOGY

## 2023-05-03 PROCEDURE — 0R9K0ZZ DRAINAGE OF LEFT SHOULDER JOINT, OPEN APPROACH: ICD-10-PCS | Performed by: STUDENT IN AN ORGANIZED HEALTH CARE EDUCATION/TRAINING PROGRAM

## 2023-05-03 PROCEDURE — 360N000075 HC SURGERY LEVEL 2, PER MIN: Performed by: STUDENT IN AN ORGANIZED HEALTH CARE EDUCATION/TRAINING PROGRAM

## 2023-05-03 PROCEDURE — 250N000009 HC RX 250: Performed by: NURSE ANESTHETIST, CERTIFIED REGISTERED

## 2023-05-03 PROCEDURE — 250N000011 HC RX IP 250 OP 636: Performed by: HOSPITALIST

## 2023-05-03 PROCEDURE — 80202 ASSAY OF VANCOMYCIN: CPT | Performed by: HOSPITALIST

## 2023-05-03 PROCEDURE — 250N000013 HC RX MED GY IP 250 OP 250 PS 637: Performed by: STUDENT IN AN ORGANIZED HEALTH CARE EDUCATION/TRAINING PROGRAM

## 2023-05-03 PROCEDURE — 87075 CULTR BACTERIA EXCEPT BLOOD: CPT | Performed by: STUDENT IN AN ORGANIZED HEALTH CARE EDUCATION/TRAINING PROGRAM

## 2023-05-03 PROCEDURE — 250N000011 HC RX IP 250 OP 636

## 2023-05-03 PROCEDURE — 0KB60ZZ EXCISION OF LEFT SHOULDER MUSCLE, OPEN APPROACH: ICD-10-PCS | Performed by: STUDENT IN AN ORGANIZED HEALTH CARE EDUCATION/TRAINING PROGRAM

## 2023-05-03 PROCEDURE — 36415 COLL VENOUS BLD VENIPUNCTURE: CPT | Performed by: HOSPITALIST

## 2023-05-03 PROCEDURE — 258N000003 HC RX IP 258 OP 636: Performed by: NURSE ANESTHETIST, CERTIFIED REGISTERED

## 2023-05-03 PROCEDURE — A9585 GADOBUTROL INJECTION: HCPCS | Performed by: HOSPITALIST

## 2023-05-03 PROCEDURE — 87205 SMEAR GRAM STAIN: CPT | Performed by: STUDENT IN AN ORGANIZED HEALTH CARE EDUCATION/TRAINING PROGRAM

## 2023-05-03 RX ORDER — POLYETHYLENE GLYCOL 3350 17 G/17G
17 POWDER, FOR SOLUTION ORAL DAILY
Status: DISCONTINUED | OUTPATIENT
Start: 2023-05-04 | End: 2023-05-07 | Stop reason: HOSPADM

## 2023-05-03 RX ORDER — OXYCODONE HYDROCHLORIDE 5 MG/1
5 TABLET ORAL EVERY 4 HOURS PRN
Status: DISCONTINUED | OUTPATIENT
Start: 2023-05-03 | End: 2023-05-07 | Stop reason: HOSPADM

## 2023-05-03 RX ORDER — LIDOCAINE 40 MG/G
CREAM TOPICAL
Status: DISCONTINUED | OUTPATIENT
Start: 2023-05-03 | End: 2023-05-03

## 2023-05-03 RX ORDER — AMOXICILLIN 250 MG
1 CAPSULE ORAL 2 TIMES DAILY
Status: DISCONTINUED | OUTPATIENT
Start: 2023-05-03 | End: 2023-05-07 | Stop reason: HOSPADM

## 2023-05-03 RX ORDER — ACETAMINOPHEN 325 MG/1
975 TABLET ORAL EVERY 8 HOURS
Status: COMPLETED | OUTPATIENT
Start: 2023-05-03 | End: 2023-05-06

## 2023-05-03 RX ORDER — FENTANYL CITRATE 0.05 MG/ML
25 INJECTION, SOLUTION INTRAMUSCULAR; INTRAVENOUS EVERY 5 MIN PRN
Status: DISCONTINUED | OUTPATIENT
Start: 2023-05-03 | End: 2023-05-03 | Stop reason: HOSPADM

## 2023-05-03 RX ORDER — ONDANSETRON 4 MG/1
4 TABLET, ORALLY DISINTEGRATING ORAL EVERY 6 HOURS PRN
Status: DISCONTINUED | OUTPATIENT
Start: 2023-05-03 | End: 2023-05-03

## 2023-05-03 RX ORDER — BISACODYL 10 MG
10 SUPPOSITORY, RECTAL RECTAL DAILY PRN
Status: DISCONTINUED | OUTPATIENT
Start: 2023-05-03 | End: 2023-05-07 | Stop reason: HOSPADM

## 2023-05-03 RX ORDER — SODIUM CHLORIDE, SODIUM LACTATE, POTASSIUM CHLORIDE, CALCIUM CHLORIDE 600; 310; 30; 20 MG/100ML; MG/100ML; MG/100ML; MG/100ML
INJECTION, SOLUTION INTRAVENOUS CONTINUOUS
Status: DISCONTINUED | OUTPATIENT
Start: 2023-05-03 | End: 2023-05-03 | Stop reason: HOSPADM

## 2023-05-03 RX ORDER — PROCHLORPERAZINE MALEATE 5 MG
10 TABLET ORAL EVERY 6 HOURS PRN
Status: DISCONTINUED | OUTPATIENT
Start: 2023-05-03 | End: 2023-05-07 | Stop reason: HOSPADM

## 2023-05-03 RX ORDER — CEFAZOLIN SODIUM 2 G/100ML
2 INJECTION, SOLUTION INTRAVENOUS EVERY 8 HOURS
Status: DISCONTINUED | OUTPATIENT
Start: 2023-05-03 | End: 2023-05-03

## 2023-05-03 RX ORDER — GLYCOPYRROLATE 0.2 MG/ML
INJECTION, SOLUTION INTRAMUSCULAR; INTRAVENOUS PRN
Status: DISCONTINUED | OUTPATIENT
Start: 2023-05-03 | End: 2023-05-03

## 2023-05-03 RX ORDER — LIDOCAINE 40 MG/G
CREAM TOPICAL
Status: DISCONTINUED | OUTPATIENT
Start: 2023-05-03 | End: 2023-05-07 | Stop reason: HOSPADM

## 2023-05-03 RX ORDER — FENTANYL CITRATE 50 UG/ML
INJECTION, SOLUTION INTRAMUSCULAR; INTRAVENOUS PRN
Status: DISCONTINUED | OUTPATIENT
Start: 2023-05-03 | End: 2023-05-03

## 2023-05-03 RX ORDER — PROPOFOL 10 MG/ML
INJECTION, EMULSION INTRAVENOUS PRN
Status: DISCONTINUED | OUTPATIENT
Start: 2023-05-03 | End: 2023-05-03

## 2023-05-03 RX ORDER — OXYCODONE HYDROCHLORIDE 5 MG/1
10 TABLET ORAL EVERY 4 HOURS PRN
Status: DISCONTINUED | OUTPATIENT
Start: 2023-05-03 | End: 2023-05-07 | Stop reason: HOSPADM

## 2023-05-03 RX ORDER — FENTANYL CITRATE 0.05 MG/ML
50 INJECTION, SOLUTION INTRAMUSCULAR; INTRAVENOUS EVERY 5 MIN PRN
Status: DISCONTINUED | OUTPATIENT
Start: 2023-05-03 | End: 2023-05-03 | Stop reason: HOSPADM

## 2023-05-03 RX ORDER — HYDROMORPHONE HCL IN WATER/PF 6 MG/30 ML
0.2 PATIENT CONTROLLED ANALGESIA SYRINGE INTRAVENOUS EVERY 5 MIN PRN
Status: DISCONTINUED | OUTPATIENT
Start: 2023-05-03 | End: 2023-05-03 | Stop reason: HOSPADM

## 2023-05-03 RX ORDER — ONDANSETRON 2 MG/ML
INJECTION INTRAMUSCULAR; INTRAVENOUS PRN
Status: DISCONTINUED | OUTPATIENT
Start: 2023-05-03 | End: 2023-05-03

## 2023-05-03 RX ORDER — ONDANSETRON 2 MG/ML
4 INJECTION INTRAMUSCULAR; INTRAVENOUS EVERY 6 HOURS PRN
Status: DISCONTINUED | OUTPATIENT
Start: 2023-05-03 | End: 2023-05-03

## 2023-05-03 RX ORDER — HYDROMORPHONE HCL IN WATER/PF 6 MG/30 ML
0.2 PATIENT CONTROLLED ANALGESIA SYRINGE INTRAVENOUS
Status: DISCONTINUED | OUTPATIENT
Start: 2023-05-03 | End: 2023-05-07 | Stop reason: HOSPADM

## 2023-05-03 RX ORDER — NEOSTIGMINE METHYLSULFATE 1 MG/ML
VIAL (ML) INJECTION PRN
Status: DISCONTINUED | OUTPATIENT
Start: 2023-05-03 | End: 2023-05-03

## 2023-05-03 RX ORDER — DEXAMETHASONE SODIUM PHOSPHATE 4 MG/ML
INJECTION, SOLUTION INTRA-ARTICULAR; INTRALESIONAL; INTRAMUSCULAR; INTRAVENOUS; SOFT TISSUE PRN
Status: DISCONTINUED | OUTPATIENT
Start: 2023-05-03 | End: 2023-05-03

## 2023-05-03 RX ORDER — ONDANSETRON 2 MG/ML
4 INJECTION INTRAMUSCULAR; INTRAVENOUS EVERY 30 MIN PRN
Status: DISCONTINUED | OUTPATIENT
Start: 2023-05-03 | End: 2023-05-03 | Stop reason: HOSPADM

## 2023-05-03 RX ORDER — HYDROMORPHONE HYDROCHLORIDE 1 MG/ML
INJECTION, SOLUTION INTRAMUSCULAR; INTRAVENOUS; SUBCUTANEOUS PRN
Status: DISCONTINUED | OUTPATIENT
Start: 2023-05-03 | End: 2023-05-03

## 2023-05-03 RX ORDER — ASPIRIN 81 MG/1
81 TABLET ORAL 2 TIMES DAILY
Status: DISCONTINUED | OUTPATIENT
Start: 2023-05-03 | End: 2023-05-07 | Stop reason: HOSPADM

## 2023-05-03 RX ORDER — SODIUM CHLORIDE, SODIUM LACTATE, POTASSIUM CHLORIDE, CALCIUM CHLORIDE 600; 310; 30; 20 MG/100ML; MG/100ML; MG/100ML; MG/100ML
INJECTION, SOLUTION INTRAVENOUS CONTINUOUS
Status: DISCONTINUED | OUTPATIENT
Start: 2023-05-03 | End: 2023-05-07 | Stop reason: HOSPADM

## 2023-05-03 RX ORDER — ONDANSETRON 4 MG/1
4 TABLET, ORALLY DISINTEGRATING ORAL EVERY 30 MIN PRN
Status: DISCONTINUED | OUTPATIENT
Start: 2023-05-03 | End: 2023-05-03 | Stop reason: HOSPADM

## 2023-05-03 RX ORDER — HYDROMORPHONE HCL IN WATER/PF 6 MG/30 ML
0.4 PATIENT CONTROLLED ANALGESIA SYRINGE INTRAVENOUS
Status: DISCONTINUED | OUTPATIENT
Start: 2023-05-03 | End: 2023-05-07 | Stop reason: HOSPADM

## 2023-05-03 RX ORDER — LIDOCAINE HYDROCHLORIDE 20 MG/ML
INJECTION, SOLUTION INFILTRATION; PERINEURAL PRN
Status: DISCONTINUED | OUTPATIENT
Start: 2023-05-03 | End: 2023-05-03

## 2023-05-03 RX ORDER — GADOBUTROL 604.72 MG/ML
8 INJECTION INTRAVENOUS ONCE
Status: COMPLETED | OUTPATIENT
Start: 2023-05-03 | End: 2023-05-03

## 2023-05-03 RX ORDER — HYDROMORPHONE HCL IN WATER/PF 6 MG/30 ML
0.4 PATIENT CONTROLLED ANALGESIA SYRINGE INTRAVENOUS EVERY 5 MIN PRN
Status: DISCONTINUED | OUTPATIENT
Start: 2023-05-03 | End: 2023-05-03 | Stop reason: HOSPADM

## 2023-05-03 RX ORDER — ACETAMINOPHEN 325 MG/1
650 TABLET ORAL EVERY 4 HOURS PRN
Status: DISCONTINUED | OUTPATIENT
Start: 2023-05-06 | End: 2023-05-03

## 2023-05-03 RX ORDER — BUPIVACAINE HYDROCHLORIDE AND EPINEPHRINE 5; 5 MG/ML; UG/ML
INJECTION, SOLUTION EPIDURAL; INTRACAUDAL; PERINEURAL
Status: DISCONTINUED
Start: 2023-05-03 | End: 2023-05-03 | Stop reason: HOSPADM

## 2023-05-03 RX ORDER — SODIUM CHLORIDE, SODIUM LACTATE, POTASSIUM CHLORIDE, CALCIUM CHLORIDE 600; 310; 30; 20 MG/100ML; MG/100ML; MG/100ML; MG/100ML
INJECTION, SOLUTION INTRAVENOUS CONTINUOUS PRN
Status: DISCONTINUED | OUTPATIENT
Start: 2023-05-03 | End: 2023-05-03

## 2023-05-03 RX ORDER — DEXMEDETOMIDINE HYDROCHLORIDE 4 UG/ML
INJECTION, SOLUTION INTRAVENOUS PRN
Status: DISCONTINUED | OUTPATIENT
Start: 2023-05-03 | End: 2023-05-03

## 2023-05-03 RX ADMIN — NEOSTIGMINE METHYLSULFATE 4 MG: 1 INJECTION, SOLUTION INTRAVENOUS at 15:09

## 2023-05-03 RX ADMIN — SODIUM CHLORIDE, POTASSIUM CHLORIDE, SODIUM LACTATE AND CALCIUM CHLORIDE: 600; 310; 30; 20 INJECTION, SOLUTION INTRAVENOUS at 13:28

## 2023-05-03 RX ADMIN — PROPOFOL 200 MG: 10 INJECTION, EMULSION INTRAVENOUS at 13:32

## 2023-05-03 RX ADMIN — ASPIRIN 81 MG: 81 TABLET, COATED ORAL at 20:15

## 2023-05-03 RX ADMIN — FENTANYL CITRATE 50 MCG: 50 INJECTION, SOLUTION INTRAMUSCULAR; INTRAVENOUS at 13:32

## 2023-05-03 RX ADMIN — FENTANYL CITRATE 50 MCG: 50 INJECTION, SOLUTION INTRAMUSCULAR; INTRAVENOUS at 16:04

## 2023-05-03 RX ADMIN — FENTANYL CITRATE 50 MCG: 50 INJECTION, SOLUTION INTRAMUSCULAR; INTRAVENOUS at 13:29

## 2023-05-03 RX ADMIN — VANCOMYCIN HYDROCHLORIDE 1500 MG: 10 INJECTION, POWDER, LYOPHILIZED, FOR SOLUTION INTRAVENOUS at 22:22

## 2023-05-03 RX ADMIN — LIDOCAINE HYDROCHLORIDE 80 MG: 20 INJECTION, SOLUTION INFILTRATION; PERINEURAL at 13:32

## 2023-05-03 RX ADMIN — GLYCOPYRROLATE 0.6 MG: 0.2 INJECTION, SOLUTION INTRAMUSCULAR; INTRAVENOUS at 15:09

## 2023-05-03 RX ADMIN — DEXMEDETOMIDINE HYDROCHLORIDE 12 MCG: 200 INJECTION INTRAVENOUS at 14:03

## 2023-05-03 RX ADMIN — DEXMEDETOMIDINE HYDROCHLORIDE 8 MCG: 200 INJECTION INTRAVENOUS at 14:06

## 2023-05-03 RX ADMIN — TRAMADOL HYDROCHLORIDE 50 MG: 50 TABLET, COATED ORAL at 07:52

## 2023-05-03 RX ADMIN — SODIUM CHLORIDE, POTASSIUM CHLORIDE, SODIUM LACTATE AND CALCIUM CHLORIDE: 600; 310; 30; 20 INJECTION, SOLUTION INTRAVENOUS at 17:26

## 2023-05-03 RX ADMIN — ACETAMINOPHEN 650 MG: 325 TABLET ORAL at 07:52

## 2023-05-03 RX ADMIN — HYDROMORPHONE HYDROCHLORIDE 0.5 MCG: 1 INJECTION, SOLUTION INTRAMUSCULAR; INTRAVENOUS; SUBCUTANEOUS at 13:36

## 2023-05-03 RX ADMIN — PHENYLEPHRINE HYDROCHLORIDE 100 MCG: 10 INJECTION INTRAVENOUS at 14:20

## 2023-05-03 RX ADMIN — SODIUM CHLORIDE, POTASSIUM CHLORIDE, SODIUM LACTATE AND CALCIUM CHLORIDE: 600; 310; 30; 20 INJECTION, SOLUTION INTRAVENOUS at 20:20

## 2023-05-03 RX ADMIN — HYDROMORPHONE HYDROCHLORIDE 0.5 MCG: 1 INJECTION, SOLUTION INTRAMUSCULAR; INTRAVENOUS; SUBCUTANEOUS at 14:05

## 2023-05-03 RX ADMIN — VANCOMYCIN HYDROCHLORIDE 1000 MG: 1 INJECTION, SOLUTION INTRAVENOUS at 07:54

## 2023-05-03 RX ADMIN — DEXAMETHASONE SODIUM PHOSPHATE 4 MG: 4 INJECTION, SOLUTION INTRA-ARTICULAR; INTRALESIONAL; INTRAMUSCULAR; INTRAVENOUS; SOFT TISSUE at 13:49

## 2023-05-03 RX ADMIN — ACETAMINOPHEN 975 MG: 325 TABLET ORAL at 20:15

## 2023-05-03 RX ADMIN — ONDANSETRON 4 MG: 2 INJECTION INTRAMUSCULAR; INTRAVENOUS at 14:25

## 2023-05-03 RX ADMIN — SENNOSIDES AND DOCUSATE SODIUM 1 TABLET: 50; 8.6 TABLET ORAL at 20:17

## 2023-05-03 RX ADMIN — Medication 2 G: at 13:40

## 2023-05-03 RX ADMIN — CEFTRIAXONE SODIUM 2 G: 2 INJECTION, POWDER, FOR SOLUTION INTRAMUSCULAR; INTRAVENOUS at 17:27

## 2023-05-03 RX ADMIN — SENNOSIDES AND DOCUSATE SODIUM 2 TABLET: 50; 8.6 TABLET ORAL at 20:16

## 2023-05-03 RX ADMIN — OXYCODONE HYDROCHLORIDE 10 MG: 5 TABLET ORAL at 22:19

## 2023-05-03 RX ADMIN — PHENYLEPHRINE HYDROCHLORIDE 100 MCG: 10 INJECTION INTRAVENOUS at 14:30

## 2023-05-03 RX ADMIN — PHENYLEPHRINE HYDROCHLORIDE 0.25 MCG/KG/MIN: 10 INJECTION INTRAVENOUS at 14:33

## 2023-05-03 RX ADMIN — GADOBUTROL 8 ML: 604.72 INJECTION INTRAVENOUS at 00:20

## 2023-05-03 RX ADMIN — PHENYLEPHRINE HYDROCHLORIDE 100 MCG: 10 INJECTION INTRAVENOUS at 14:44

## 2023-05-03 RX ADMIN — PROPOFOL 50 MG: 10 INJECTION, EMULSION INTRAVENOUS at 14:03

## 2023-05-03 RX ADMIN — TRAMADOL HYDROCHLORIDE 50 MG: 50 TABLET, COATED ORAL at 17:03

## 2023-05-03 RX ADMIN — ROCURONIUM BROMIDE 50 MG: 50 INJECTION, SOLUTION INTRAVENOUS at 13:32

## 2023-05-03 RX ADMIN — MIDAZOLAM 2 MG: 1 INJECTION INTRAMUSCULAR; INTRAVENOUS at 13:28

## 2023-05-03 ASSESSMENT — ACTIVITIES OF DAILY LIVING (ADL)
ADLS_ACUITY_SCORE: 18

## 2023-05-03 ASSESSMENT — ENCOUNTER SYMPTOMS
DYSRHYTHMIAS: 0
SEIZURES: 0

## 2023-05-03 ASSESSMENT — LIFESTYLE VARIABLES: TOBACCO_USE: 0

## 2023-05-03 NOTE — PROGRESS NOTES
Shift Summary:  [id number]  A/O x4. VSS on Ra. Indep in RM/hallway. PIV SL with intermittent abx infused. L shoulder redness. Aspiration site dressing CDI. CMS intact. Pain managed with prn Tramadol and Tylenol together per pt request. MRI of L shoulder done. NPO @ midnight for possible I&D tomorrow. New order for MRI with contrast. Pt request anxiety med before going for MRI. On-call paged. Old IV line leaking. IV Vanco on hold. New IV line placed. PRN 1mg Ativan ordered and given. Pt down @ the MRI at the moment.

## 2023-05-03 NOTE — BRIEF OP NOTE
[facility]    Brief Operative Note    Pre-operative diagnosis: Pyogenic arthritis of left shoulder region, due to unspecified organism (H) [M00.9]  Post-operative diagnosis Same as pre-operative diagnosis    Procedure: Procedure(s):  LEFT SHOULDER ABSCESS IRRIGATION AND DEBRIDEMENT, LEFT SHOULDER CHRONIC WOUND IRRIGATION AND DEBRIDEMENT  Surgeon: Surgeon(s) and Role:     * Carlitos Garza MD - Primary     * Kenyatta Ferraro PA-C - Assisting     * Elian Lewis - Assisting  Anesthesia: Choice with Block   Estimated Blood Loss: 100 mL from 5/3/2023  1:28 PM to 5/3/2023  3:28 PM      Drains: Hemovac x2  Specimens:   ID Type Source Tests Collected by Time Destination   A : LEFT SHOULDER ABSCESS Abscess Shoulder, Left ANAEROBIC BACTERIAL CULTURE ROUTINE, GRAM STAIN, AEROBIC BACTERIAL CULTURE ROUTINE Carlitos Garza MD 5/3/2023  2:17 PM    B : LEFT SHOULDER TISSUE #1  Tissue Shoulder, Left ANAEROBIC BACTERIAL CULTURE ROUTINE, GRAM STAIN, AEROBIC BACTERIAL CULTURE ROUTINE Carlitos Garza MD 5/3/2023  2:26 PM    C : LEFT SHOULDER TISSUE #2 Tissue Shoulder, Left ANAEROBIC BACTERIAL CULTURE ROUTINE, GRAM STAIN, AEROBIC BACTERIAL CULTURE ROUTINE Carlitos Garza MD 5/3/2023  2:32 PM    D : POSTERIOR SHOULDER #1 Tissue Shoulder, Left ANAEROBIC BACTERIAL CULTURE ROUTINE, GRAM STAIN, AEROBIC BACTERIAL CULTURE ROUTINE Carlitos Garza MD 5/3/2023  2:33 PM    E : POSTERIOR SHOULDER #2 Tissue Shoulder, Left ANAEROBIC BACTERIAL CULTURE ROUTINE, GRAM STAIN, AEROBIC BACTERIAL CULTURE ROUTINE Carlitos Garza MD 5/3/2023  2:43 PM    F : SYNOVIAL FLUID Synovial fluid Shoulder, Left ANAEROBIC BACTERIAL CULTURE ROUTINE, GRAM STAIN, AEROBIC BACTERIAL CULTURE ROUTINE Carlitos Garza MD 5/3/2023  2:46 PM      Findings:   Evidence of infection: abscess .  Complications: None.  Implants: * No implants in log *      Plan:  Continue Vanco per ID. Will follow up with cultures  POD1.   Restart Aspirin 81 mg for DVT prophylaxis.   WBAT. Remove dressing on POD2.   2 Hemovac drains placed - remove on POD3 or if output less than 30cc per shift.    Please call as soon as possible to make an appointment to be seen in Dr. Carlitos Garza's clinic in 1-2 weeks.     Dr. Garza's care coordinator is Noni Doran. Please contact her at [phone] to schedule an appointment.      Dr. Garza sees patients at 2 clinic locations:    [facility]  o [address] [place] [address]    [facility]   o [address] , [address], [place] [address]      Please call the on-call phone number [phone] during evenings, nights and weekends for any urgent needs. Prescription refills must be done during business hours by calling [phone]    Kenyatta Ferraro PA-C  [facility]

## 2023-05-03 NOTE — PROGRESS NOTES
[facility]    Medicine Progress Note - Hospitalist Service    Date of Admission:  4/30/2023    Assessment & Plan       left Shoulder  Abscess  # Septic athritis suspected?  Patient presenting with a 1 week history of left shoulder pain with swelling and warmth.  Additionally he has a likely sinus tract on the lateral posterior aspect of the shoulder which she reports was draining pus earlier this week.  White count mildly elevated but an elevated CRP.  X-ray of the shoulder could represent chronic degenerative changes versus infection.  Case was discussed with orthopedic surgery in the emergency room who recommended getting a joint aspiration prior to starting antibiotics.  Patient is not septic does not require stat antibiotics at this time.  IR was consulted for arthrocentesis.  -S/P IR athrocentesis on 5/1/23 Follow synovial fluid testing  - ortho consulted and undergoing Inscion and drainage  -antibiotics vancomycin and ceftixone started on 5/1/23  -Not enough fluid for cell count and crystals   -Pain control as needed  MRI    Peripherally enhancing fluid collection in the left axilla, 4.2 x 4.6 x 6.8 cm, suspicious for an abscess.  2.  Additional thin tubular fluid collection in the posterior subcutaneous tissue of the shoulder could also represent an abscess. This does not appear to communicate with the dominant axillary collection.  3.  No glenohumeral joint effusion.  4.  Severe degenerative changes of the left glenohumeral joint. Chronic rotator cuff tear and postsurgical changes.  Underwent I and D by orthopedics                 Primary prevention of CAD  The patient takes a baby aspirin for primary prevention.   Hold aspirin           Diet:      DVT Prophylaxis: Pneumatic Compression Devices  Perdomo Catheter: Not present  Lines: None     Cardiac Monitoring: None  Code Status: Full Code      Clinically Significant Risk Factors                                 Disposition Plan       Expected Discharge Date: 05/04/2023        Discharge Comments: Possible surgery          Nickie Torres MD  Hospitalist Service  [facility]  Securely message with EAR[employer] (more info)  Text page via [employer] Paging/Directory   ______________________________________________________________________    Interval History   I have seen the patient in the PACU   No chest pain  No shortness of breath      Physical Exam   Vital Signs: Temp: 97.7  F (36.5  C) Temp src: Temporal BP: 129/70 Pulse: 86   Resp: 14 SpO2: 96 % O2 Device: Nasal cannula Oxygen Delivery: 2 LPM  Weight: 185 lbs 0 oz  Physical Exam  Pulmonary:      Effort: Pulmonary effort is normal. No respiratory distress.   Musculoskeletal:      Comments: Left shoulder covered with surgical dressing       Medical Decision Making       Data     I have personally reviewed the following data over the past 24 hrs:    N/A  \   N/A   / N/A     N/A N/A N/A /  N/A   N/A N/A 0.74 \

## 2023-05-03 NOTE — PROGRESS NOTES
MD Notification    Notified Person: MD    Notified Person Name:Mohsin Salih    Notification Date/Time:5/2/23 @ 2031    Notification Interaction: Amcom    Purpose of Notification:Pt 8460 schedule for MRI. Pt is anxious and need some prn Ativan before going.     Orders Received: 1mg Ativan ordered.    Comments:

## 2023-05-03 NOTE — PROGRESS NOTES
Pt A&Ox4; VSS on RA. Independent in room. Pain managed with current regimen. Redness and swelling along inferior aspect of L shoulder/axilla region continue. Dressings CDI. PIV SL. Pt has been NPO for possible I & D today.

## 2023-05-03 NOTE — ANESTHESIA PREPROCEDURE EVALUATION
Anesthesia Pre-Procedure Evaluation    Patient: Simone Mercedes   MRN: [id number] : 1960        Procedure : Procedure(s):  LEFT SHOULDER IRRIGATION AND DEBRIDEMENT          No past medical history on file.   No past surgical history on file.   No Known Allergies   Social History     Tobacco Use     Smoking status: Not on file     Smokeless tobacco: Not on file   Substance Use Topics     Alcohol use: Not on file      Wt Readings from Last 1 Encounters:   23 83.9 kg (185 lb)        Anesthesia Evaluation   Pt has had prior anesthetic. Type: General.    No history of anesthetic complications       ROS/MED HX  ENT/Pulmonary:    (-) tobacco use, asthma and sleep apnea   Neurologic:     (+) no peripheral neuropathy  (-) no seizures and no CVA   Cardiovascular:    (-) hypertension, CAD and arrhythmias   METS/Exercise Tolerance: >4 METS    Hematologic:       Musculoskeletal: Comment: infection      GI/Hepatic:    (-) GERD   Renal/Genitourinary:    (-) renal disease   Endo:    (-) Type II DM and thyroid disease   Psychiatric/Substance Use:       Infectious Disease: Comment: Pyogenic arthritis of left shoulder region, due to unspecified organism (H) (M00.9)   (-) Recent Fever   Malignancy:       Other:            Physical Exam    Airway  airway exam normal      Mallampati: II   TM distance: > 3 FB   Neck ROM: full   Mouth opening: > 3 cm    Respiratory Devices and Support         Dental       (+) Minor Abnormalities - some fillings, tiny chips      Cardiovascular   cardiovascular exam normal          Pulmonary   pulmonary exam normal                OUTSIDE LABS:  CBC:   Lab Results   Component Value Date    WBC 10.2 2023    WBC 11.9 (H) 2023    HGB 14.4 2023    HGB 14.6 2023    HCT 43.7 2023    HCT 44.5 2023     2023     2023     BMP:   Lab Results   Component Value Date     2023     2023    POTASSIUM 3.8 2023     POTASSIUM 3.8 04/30/2023    CHLORIDE 101 05/01/2023    CHLORIDE 98 04/30/2023    CO2 23 05/01/2023    CO2 29 04/30/2023    BUN 10.1 05/01/2023    BUN 17.1 04/30/2023    CR 0.74 05/03/2023    CR 0.74 05/02/2023    GLC 95 05/01/2023     (H) 04/30/2023     COAGS: No results found for: PTT, INR, FIBR  POC: No results found for: BGM, HCG, HCGS  HEPATIC:   Lab Results   Component Value Date    ALBUMIN 3.8 05/01/2023    PROTTOTAL 7.0 05/01/2023    ALT 16 05/01/2023    AST 16 05/01/2023    ALKPHOS 49 05/01/2023    BILITOTAL 0.4 05/01/2023     OTHER:   Lab Results   Component Value Date    LACT 0.6 (L) 04/30/2023    RUPA 9.0 05/01/2023    SED 25 (H) 04/30/2023       Anesthesia Plan    ASA Status:  2   NPO Status:  NPO Appropriate    Anesthesia Type: General.     - Airway: ETT   Induction: Intravenous.   Maintenance: Balanced.        Consents    Anesthesia Plan(s) and associated risks, benefits, and realistic alternatives discussed. Questions answered and patient/representative(s) expressed understanding.    - Discussed:     - Discussed with:  Patient         Postoperative Care    Pain management: IV analgesics, Oral pain medications.   PONV prophylaxis: Ondansetron (or other 5HT-3), Dexamethasone or Solumedrol, Background Propofol Infusion     Comments:                Brock Preston MD

## 2023-05-03 NOTE — ANESTHESIA PROCEDURE NOTES
Airway       Patient location during procedure: OR       Procedure Start/Stop Times: 5/3/2023 1:35 PM  Staff -        Performed By: CRNA  Consent for Airway        Urgency: elective  Indications and Patient Condition       Indications for airway management: patricia-procedural       Induction type:intravenous       Mask difficulty assessment: 1 - vent by mask    Final Airway Details       Final airway type: endotracheal airway       Successful airway: ETT - single and Oral  Endotracheal Airway Details        ETT size (mm): 8.0       Cuffed: yes       Successful intubation technique: direct laryngoscopy       DL Blade Type: Horne 2       Adjucts: stylet       Position: Right       Measured from: gums/teeth       Secured at (cm): 22       Bite block used: None    Post intubation assessment        Placement verified by: capnometry        Number of attempts at approach: 1       Secured with: commercial tube ring and pink tape       Ease of procedure: easy       Dentition: Intact and Unchanged    Medication(s) Administered   Medication Administration Time: 5/3/2023 1:35 PM

## 2023-05-03 NOTE — ANESTHESIA CARE TRANSFER NOTE
Patient: Simone Mercedes    Procedure: Procedure(s):  LEFT SHOULDER ABSCESS IRRIGATION AND DEBRIDEMENT, LEFT SHOULDER CHRONIC WOUND IRRIGATION AND DEBRIDEMENT       Diagnosis: Pyogenic arthritis of left shoulder region, due to unspecified organism (H) [M00.9]  Diagnosis Additional Information: No value filed.    Anesthesia Type:   General     Note:    Oropharynx: oropharynx clear of all foreign objects and spontaneously breathing  Level of Consciousness: awake  Oxygen Supplementation: face mask  Level of Supplemental Oxygen (L/min / FiO2): 6  Independent Airway: airway patency satisfactory and stable  Dentition: dentition unchanged  Vital Signs Stable: post-procedure vital signs reviewed and stable  Report to RN Given: handoff report given  Patient transferred to: PACU  Comments:     Neuromuscular blockade reversed after TOF 4/4, spontaneous respirations, adequate tidal volumes, followed commands to voice, oropharynx suctioned with soft flexible catheter, extubated atraumatically, extubated with suction, airway patent after extubation.  Oxygen via facemask at 6 liters per minute to PACU. Oxygen tubing connected to wall O2 in PACU, SpO2, NiBP, and EKG monitors and alarms on and functioning, report on patient's clinical status given to PACU RN, RN questions answered.         Handoff Report: Identifed the Patient, Identified the Reponsible Provider, Reviewed the pertinent medical history, Discussed the surgical course, Reviewed Intra-OP anesthesia mangement and issues during anesthesia, Set expectations for post-procedure period and Allowed opportunity for questions and acknowledgement of understanding      Vitals:  Vitals Value Taken Time   /76    Temp     Pulse 76 05/03/23 1532   Resp 19 05/03/23 1532   SpO2 100 % 05/03/23 1532   Vitals shown include unvalidated device data.    Electronically Signed By: ESMER Helm CRNA  May 3, 2023  3:33 PM

## 2023-05-03 NOTE — OP NOTE
ORTHOPEDIC SURGERY  OPERATIVE NOTE    Simone Mercedes  [id number]  1960    May 3, 2023      PREOPERATIVE DIAGNOSIS:    1. Left upper extremity abscess (6.8 x 4.6 x 4.2cm)  2. Left upper extremity chronic superficial posterior wound (1.8 x 0.9 x 5x5cm)    POSTOPERATIVE DIAGNOSIS:   Same    PROCEDURE:    1. Left upper extremity irrigation and excisional debridement of abscess  2.  Left upper extremity irrigation and excisional debridement of chronic posterior wound    SURGEON:  Carlitos Garza MD    ASSISTANT:     Kenyatta Ferraro PA-C; A skilled first assistant was necessary for this procedure for assistance with patient positioning, prepping, draping, surgical visualization, wound closure, and application of the dressing.     Elian Lewis, OTC    SPECIMENS:    Axillary abscess: Fluid x1, Tissues x 2,   GH Joint: Fluid x 1  Posterior wound: Tissue x 2    COMPLICATIONS:  None    ESTIMATED BLOOD LOSS: 100cc    IMPLANTS: * No implants in log *    TOURNIQUET TIME:  0min @ 250 mmHg    INDICATIONS:    Simone Mercedes is a 63 year old male who presents with  left shoulder pain concerning for infection.  He has a longstanding history of left shoulder pain.  He follows with Dr. Max regarding his left shoulder.  He has a history of rotator cuff repairs and was looking into potentially undergoing a reverse shoulder arthroplasty.  He notes a chronic wound over the posterior aspect of his shoulder which has been draining for over a year.  He reports he thought it was a small superficial infection but it has never really healed.      He presented to the hospital after noticing increased pain in his axillary region near his pec. This area has been enlarging in size and becoming more painful.     Radiographs of his left upper extremity were reviewed and reveal significant narrowing of his glenohumeral joint space as well as superior migration of his humerus. A MRI was obtained which revealed to fluid  collections. 1. A more superficial posterior collection fitting with his chronic wound and a deep fluid collection near the axillary pec/biceps region. The patient has been on Vancomycin and reports no significant improvement.     We discussed possible treatment options including nonoperative and operative intervention along with the risks, benefits, and recovery of each option.   He is aware of risks related to reoperation, ongoing infection, postoperative arthritis, wound issues, neurovascular injury, need for further surgery, we discussed he may require multiple debridements to get rid of his infection.  He is a high risk related to anesthesia and loss of motion and stiffness.    Alternatives including nonoperative management were discussed, but not recommended.  The patient was in agreement with the plan to proceed.  The informed consent was signed and documented.  Met with the patient preoperatively to cora the operative extremity.    OPERATIVE COURSE:    The patient was brought into the operating room and placed on operating table.  The patient underwent general anesthesia.  The patient was positioned in a beach chair position.  All bony prominences were well-padded.  The operative extremity was cleansed with Hibiclens. The operative extremity was then prepped with ChloraPrep.  The surgical team scrubbed in.  A WHO timeout was conducted to verify correct patient, correct extremity, presence of the surgeon's initials on the operative extremity, and administration of IV antibiotics, in this case he was on vancomycin.  Ancef was added as a preoperative antibiotic.      Axillary abscess:  A deltopectoral incision was outlined.  This was then incised.  Electrocautery was utilized for soft tissue.  The cephalic vein was identified protected.  It was retracted out laterally along with the deltoid.  The pec muscle was retracted medially.  The coracoid was identified along with the conjoined tendon.  Blunt dissection  was then utilized just anterior to the conjoined tendon and a significant amount of purulent appearing tissue was expressed.  Fluid was sent for culture.  The abscess pocket was identified.  An additional 2 tissue cultures were sent.  Curettes were used within the abscess pocket to remove tissue.    An excisional debridement of the abscess was then performed.  Nonviable issue including fascia, muscle was debrided.     The wound was then copiously irrigated with Pulsavac.  A dilute Betadine wash was performed along with an antibiotic wash.  We then proceeded with opening the rotator cuff interval.  Prior shoulder aspirate cultures were positive for gram-positive cocci in broth.  The glenohumeral joint was aspirated.  It was an essentially a dry tap but a few drops of synovial fluid was obtained and sent for culture.  Joint was then copiously irrigated with Pulsavac.    Posterior shoulder wound  The patient was noted to have 2 small 1 cm wounds on the posterior aspect of the shoulder near his posterior axillary fold.  The MRI did not show any connection between this and the anterior abscess.  A incision connecting both of these wounds was made.  The area of drainage excised.  There was no significant purulent material noted.  There was some nonviable tissue in the subcutaneous area down to fascia.  The area was probed in all directions to identify any collections but none were identified.    An excisional debridement was performed of nonviable tissue including muscle and fascia.    Both wounds were then copiously irrigated again with Pulsavac and dilute Betadine.  2 Hemovac drains were then placed the abscess in 1 of posterior shoulder wound.  Both drains exited laterally.  Drains were sewn in with 2-0 nylon    The wounds were then closed with 0 Monocryl in the deep layer followed by 2-0 Monocryl along the skin staples were used on the final layer.  The wounds were then dressed with Xeroform, gauze, ABDs and Dermabond  tape.      All instruments were accounted for at the end of the case. The patient awoke from anesthesia and was transferred to the PACU in stable condition.      POST OP PLAN:    1.  Continue Vancomycin per ID   2.  ASA 81mg daily  3.  No  PACU x-rays.    4.   Weightbearing as tolerated  5.  Physical therapy/occupational therapy.   6.  Dressing change POD2/3  7.  Remove hemovacs POD2 or when <30cc per shift.   8.  Follow-up cultures, ID consult        FOLLOWUP:     Please call as soon as possible to make an appointment to be seen in Dr. Carlitos Garza's clinic in 1-2 weeks.     Dr. Garza's care coordinator is Noni Doran. Please contact her at [phone] to schedule an appointment.      Dr. Garza sees patient's at 2 clinic locations:    [facility]  o [address] [place] [address]    [facility]   o [address], [address], [place] [address]      Please call the on-call phone number [phone] during evenings, nights and weekends for any urgent needs. Prescription refills must be done during business hours by calling [phone]      Carlitos Garza MD  [facility]

## 2023-05-03 NOTE — PROGRESS NOTES
Orthopedic Surgery  Simone Mercedes  05/03/2024     Admit Date:  4/30/2023  Left shoulder pain with abscess formation     Patient resting comfortably in chair.    Pain controlled.  NPO for procedure    Temp:  [98  F (36.7  C)-98.8  F (37.1  C)] 98.8  F (37.1  C)  Pulse:  [] 95  Resp:  [18] 18  BP: (136-153)/(81-88) 150/87  SpO2:  [96 %-97 %] 96 %    On exam of the left shoulder: There is continued erythema and swelling along the inferior aspect of the left shoulder/axilla region.   He does have a small scabbed over wound to the posterior aspect of the left shoulder that does have mild drainage.  Patient has very limited range of motion with forward flexion to approximately 40 degrees very limited external rotation to 10 degrees.  Has pain at the end range of motion.  Moderate left shoulder joint effusion present.  Patient denies pain with range of motion of his right upper extremity, left elbow left wrist.  Patient has equal sensation to light touch in the left versus right upper extremity and distal pulses are intact and equal bilaterally.    Labs:  Recent Labs   Lab Test 05/01/23  0740 04/30/23  1647   WBC 10.2 11.9*   HGB 14.4 14.6    301     No lab results found.  Recent Labs   Lab Test 05/02/23  0641 04/30/23  1647   CRPI 140.07* 127.71*     Aspiration:   Not enough fluid for crystals or cell count.    Gram stain negative    MRI:   1.  Peripherally enhancing fluid collection in the left axilla, 4.2 x 4.6 x 6.8 cm, suspicious for an abscess.  2.  Additional thin tubular fluid collection in the posterior subcutaneous tissue of the shoulder could also represent an abscess. This does not appear to communicate with the dominant axillary collection.  3.  No glenohumeral joint effusion.  4.  Severe degenerative changes of the left glenohumeral joint. Chronic rotator cuff tear and postsurgical changes.    1. PLAN:   MRI of the left shoulder reveals large abscess formation    Scheduled for I&D later  today   NPO effective now   Surgeon: Dr HIMANSHU Garza   Continue Abx     2. Disposition   Continue cares     Radha Aguayo PA-C

## 2023-05-03 NOTE — ANESTHESIA POSTPROCEDURE EVALUATION
Patient: Simone Mercedes    Procedure: Procedure(s):  LEFT SHOULDER ABSCESS IRRIGATION AND DEBRIDEMENT, LEFT SHOULDER CHRONIC WOUND IRRIGATION AND DEBRIDEMENT       Anesthesia Type:  General    Note:     Postop Pain Control: Uneventful            Sign Out: Well controlled pain   PONV: No   Neuro/Psych: Uneventful            Sign Out: Acceptable/Baseline neuro status   Airway/Respiratory: Uneventful            Sign Out: Acceptable/Baseline resp. status   CV/Hemodynamics: Uneventful            Sign Out: Acceptable CV status; No obvious hypovolemia; No obvious fluid overload   Other NRE: NONE   DID A NON-ROUTINE EVENT OCCUR? No           Last vitals:  Vitals Value Taken Time   /73 05/03/23 1630   Temp 36.2  C (97.2  F) 05/03/23 1630   Pulse 97 05/03/23 1632   Resp 45 05/03/23 1632   SpO2 97 % 05/03/23 1634   Vitals shown include unvalidated device data.    Electronically Signed By: Simone Edwards MD  May 3, 2023  5:30 PM

## 2023-05-04 ENCOUNTER — APPOINTMENT (OUTPATIENT)
Dept: OCCUPATIONAL THERAPY | Facility: CLINIC | Age: 63
DRG: 501 | End: 2023-05-04
Payer: COMMERCIAL

## 2023-05-04 LAB
CREAT SERPL-MCNC: 0.82 MG/DL (ref 0.67–1.17)
ERYTHROCYTE [DISTWIDTH] IN BLOOD BY AUTOMATED COUNT: 13.6 % (ref 10–15)
GFR SERPL CREATININE-BSD FRML MDRD: >90 ML/MIN/1.73M2
GLUCOSE BLDC GLUCOMTR-MCNC: 107 MG/DL (ref 70–99)
HCT VFR BLD AUTO: 37.9 % (ref 40–53)
HGB BLD-MCNC: 12.2 G/DL (ref 13.3–17.7)
HGB BLD-MCNC: 12.2 G/DL (ref 13.3–17.7)
MCH RBC QN AUTO: 29.3 PG (ref 26.5–33)
MCHC RBC AUTO-ENTMCNC: 32.7 G/DL (ref 31.5–36.5)
MCV RBC AUTO: 90 FL (ref 78–100)
PLATELET # BLD AUTO: 290 10E3/UL (ref 150–450)
RBC # BLD AUTO: 4.23 10E6/UL (ref 4.4–5.9)
WBC # BLD AUTO: 13 10E3/UL (ref 4–11)

## 2023-05-04 PROCEDURE — 85027 COMPLETE CBC AUTOMATED: CPT | Performed by: STUDENT IN AN ORGANIZED HEALTH CARE EDUCATION/TRAINING PROGRAM

## 2023-05-04 PROCEDURE — 250N000011 HC RX IP 250 OP 636: Performed by: HOSPITALIST

## 2023-05-04 PROCEDURE — 250N000013 HC RX MED GY IP 250 OP 250 PS 637

## 2023-05-04 PROCEDURE — 97165 OT EVAL LOW COMPLEX 30 MIN: CPT | Mod: GO

## 2023-05-04 PROCEDURE — 99232 SBSQ HOSP IP/OBS MODERATE 35: CPT | Performed by: STUDENT IN AN ORGANIZED HEALTH CARE EDUCATION/TRAINING PROGRAM

## 2023-05-04 PROCEDURE — 250N000011 HC RX IP 250 OP 636: Performed by: STUDENT IN AN ORGANIZED HEALTH CARE EDUCATION/TRAINING PROGRAM

## 2023-05-04 PROCEDURE — 250N000013 HC RX MED GY IP 250 OP 250 PS 637: Performed by: STUDENT IN AN ORGANIZED HEALTH CARE EDUCATION/TRAINING PROGRAM

## 2023-05-04 PROCEDURE — 36415 COLL VENOUS BLD VENIPUNCTURE: CPT

## 2023-05-04 PROCEDURE — 258N000003 HC RX IP 258 OP 636: Performed by: HOSPITALIST

## 2023-05-04 PROCEDURE — 120N000001 HC R&B MED SURG/OB

## 2023-05-04 PROCEDURE — 85018 HEMOGLOBIN: CPT

## 2023-05-04 PROCEDURE — 82565 ASSAY OF CREATININE: CPT | Performed by: HOSPITALIST

## 2023-05-04 PROCEDURE — 250N000013 HC RX MED GY IP 250 OP 250 PS 637: Performed by: HOSPITALIST

## 2023-05-04 PROCEDURE — 99222 1ST HOSP IP/OBS MODERATE 55: CPT | Performed by: INTERNAL MEDICINE

## 2023-05-04 PROCEDURE — 97530 THERAPEUTIC ACTIVITIES: CPT | Mod: GO

## 2023-05-04 RX ORDER — SACCHAROMYCES BOULARDII 250 MG
250 CAPSULE ORAL 2 TIMES DAILY
Status: DISCONTINUED | OUTPATIENT
Start: 2023-05-04 | End: 2023-05-07 | Stop reason: HOSPADM

## 2023-05-04 RX ADMIN — CEFTRIAXONE SODIUM 2 G: 2 INJECTION, POWDER, FOR SOLUTION INTRAMUSCULAR; INTRAVENOUS at 15:57

## 2023-05-04 RX ADMIN — ASPIRIN 81 MG: 81 TABLET, COATED ORAL at 09:12

## 2023-05-04 RX ADMIN — OXYCODONE HYDROCHLORIDE 10 MG: 5 TABLET ORAL at 21:17

## 2023-05-04 RX ADMIN — Medication 250 MG: at 22:32

## 2023-05-04 RX ADMIN — VANCOMYCIN HYDROCHLORIDE 1500 MG: 10 INJECTION, POWDER, LYOPHILIZED, FOR SOLUTION INTRAVENOUS at 09:28

## 2023-05-04 RX ADMIN — ACETAMINOPHEN 975 MG: 325 TABLET ORAL at 04:26

## 2023-05-04 RX ADMIN — SENNOSIDES AND DOCUSATE SODIUM 1 TABLET: 50; 8.6 TABLET ORAL at 09:12

## 2023-05-04 RX ADMIN — POLYETHYLENE GLYCOL 3350 17 G: 17 POWDER, FOR SOLUTION ORAL at 09:12

## 2023-05-04 RX ADMIN — ACETAMINOPHEN 975 MG: 325 TABLET ORAL at 21:17

## 2023-05-04 RX ADMIN — SENNOSIDES AND DOCUSATE SODIUM 1 TABLET: 50; 8.6 TABLET ORAL at 21:19

## 2023-05-04 RX ADMIN — VANCOMYCIN HYDROCHLORIDE 1500 MG: 10 INJECTION, POWDER, LYOPHILIZED, FOR SOLUTION INTRAVENOUS at 21:20

## 2023-05-04 RX ADMIN — ASPIRIN 81 MG: 81 TABLET, COATED ORAL at 21:39

## 2023-05-04 RX ADMIN — ACETAMINOPHEN 975 MG: 325 TABLET ORAL at 13:20

## 2023-05-04 RX ADMIN — SENNOSIDES AND DOCUSATE SODIUM 2 TABLET: 50; 8.6 TABLET ORAL at 21:17

## 2023-05-04 ASSESSMENT — ACTIVITIES OF DAILY LIVING (ADL)
ADLS_ACUITY_SCORE: 18

## 2023-05-04 NOTE — PLAN OF CARE
Occupational Therapy Discharge Summary    Reason for therapy discharge:    All goals and outcomes met, no further needs identified.    Progress towards therapy goal(s). See goals on Care Plan in [facility] electronic health record for goal details.  Goals met    Therapy recommendation(s):    No further therapy is recommended. Patient below baseline functioning, and WBAT for LUE following procedure. Patient however moving well and demonstrating safety awareness, general independence with basic ADL tasks. Recommend assist for heavy I/ ADLs and driving.

## 2023-05-04 NOTE — PROGRESS NOTES
05/04/23 1320   Appointment Info   Signing Clinician's Name / Credentials (OT) Margaux Sanchez OTR/L   Living Environment   People in Home spouse   Current Living Arrangements house   Home Accessibility stairs to enter home;stairs within home   Number of Stairs, Main Entrance 3   Stair Railings, Main Entrance railings safe and in good condition   Number of Stairs, Within Home, Primary greater than 10 stairs   Stair Railings, Within Home, Primary railings safe and in good condition   Transportation Anticipated family or friend will provide   Living Environment Comments Patient lives in 2-story home wtih spouse and has claw-foot tub   Self-Care   Usual Activity Tolerance good   Current Activity Tolerance good   Equipment Currently Used at Home none   Fall history within last six months no   Activity/Exercise/Self-Care Comment Independent with mobility and ADLs   Instrumental Activities of Daily Living (IADL)   IADL Comments indep and shares responsibilities with spouse   General Information   Onset of Illness/Injury or Date of Surgery 04/30/23   Referring Physician Kenyatta Ferraro PA-C   Patient/Family Therapy Goal Statement (OT) Return home   Additional Occupational Profile Info/Pertinent History of Current Problem POD: 1 Day Post-Op   Procedure(s):  LEFT SHOULDER ABSCESS IRRIGATION AND DEBRIDEMENT, LEFT SHOULDER CHRONIC WOUND IRRIGATION AND DEBRIDEMENT   Existing Precautions/Restrictions weight bearing   Left Upper Extremity (Weight-bearing Status) weight-bearing as tolerated (WBAT)   Cognitive Status Examination   Orientation Status orientation to person, place and time   Pain Assessment   Patient Currently in Pain No   Range of Motion Comprehensive   General Range of Motion upper extremity range of motion deficits identified   Comment, General Range of Motion L shoulder with limitations (to roughly 80 deg forward flexion) following I & D   Strength Comprehensive (MMT)   Comment, General Manual Muscle Testing  (MMT) Assessment LUE below baseline as expected following surgery.   Transfers   Transfers No deficits identified   Balance   Balance Assessment no deficits were identified   Activities of Daily Living   BADL Assessment/Intervention upper body dressing;lower body dressing;grooming   Upper Body Dressing Assessment/Training   Comment, (Upper Body Dressing) Patient fully dressed. No concerns with dressing.   Lower Body Dressing Assessment/Training   Comment, (Lower Body Dressing) Patient fully dressed. No concerns with dressing.   Grooming Assessment/Training   [place] Level (Grooming) verbal cues   Comment, (Grooming) Educated patient on compensatory strategies for applying deodorant.   Clinical Impression   Criteria for Skilled Therapeutic Interventions Met (OT) Yes, treatment indicated   OT Diagnosis Decreased independence in ADLs   OT Problem List-Impairments impacting ADL problems related to;strength;range of motion (ROM)   Assessment of Occupational Performance 1-3 Performance Deficits   Identified Performance Deficits Home management, driving, bathing.   Planned Therapy Interventions (OT) ADL retraining;ROM;home program guidelines   Clinical Decision Making Complexity (OT) low complexity   Risk & Benefits of therapy have been explained evaluation/treatment results reviewed;care plan/treatment goals reviewed;risks/benefits reviewed;current/potential barriers reviewed;participants voiced agreement with care plan;participants included;patient   OT Total Evaluation Time   OT Eval, Low Complexity Minutes (20968) 10   OT Goals   Therapy Frequency (OT) One time eval and treatment   OT Predicted Duration/Target Date for Goal Attainment 05/04/23   OT Goals Hygiene/Grooming;OT Goal 1;OT Goal 2   OT: Hygiene/Grooming modified independent;Goal Met   OT: Goal 1 Patient will accurately complete HEP with LUE to progress with functional use, without VCs.  (met)   OT: Goal 2 Patient will ambulate in hallway 400 feet, and  ascend/descend 10+ stairs independently, in preparation for return home.  (Met)   Interventions   Interventions Quick Adds Therapeutic Activity   Therapeutic Activities   Therapeutic Activity Minutes (18415) 10   Symptoms noted during/after treatment none   Treatment Detail/Skilled Intervention Patient in room with spouse on entry. Verablized agreement to OT eval. Education provided regarding HEP for LUE (Codman's pendulum, AROM for hand, elbow and wrist), wtih patient demonstrating good understanding and performance independently. Supervision for ambulation in hallway 400 without AD, SBA for ascending/descending full flight of stairs. Patient verbalized good understanding of recommendations regarding bathing, grooming/hygiene.   OT Discharge Planning   OT Plan DC OT   OT Discharge Recommendation (DC Rec) home with assist   OT Rationale for DC Rec Patient below baseline functioning, and WBAT for LUE following procedure. Patient however moving well and demonstrating safety awareness, general independence with basic ADL tasks. Recommend assist for heavy I/ ADLs and driving.   OT Brief overview of current status Indep in room. Good understanding of HEP.   Total Session Time   Timed Code Treatment Minutes 10   Total Session Time (sum of timed and untimed services) 20

## 2023-05-04 NOTE — PROGRESS NOTES
Patient vital signs are at baseline: Yes  Patient able to ambulate as they were prior to admission or with assist devices provided by therapies during their stay:  Yes  Patient MUST void prior to discharge: Yes  Patient able to tolerate oral intake: Yes  Pain has adequate pain control using Oral analgesics:  Yes  Does patient have an identified : Yes  Has goal D/C date and time been discussed with patient:  TBD    Pt is alert and oriented x 4, PIV SL, independent, tolerating regular diet. CMS .Dressing was reinforced due to leakage. PRN oxycodone given x 1. Discharge pending

## 2023-05-04 NOTE — PHARMACY-VANCOMYCIN DOSING SERVICE
Pharmacy Vancomycin Note  Date of Service May 3, 2023  Patient's  1960   63 year old, male  TBW = 83.9 kg    Indication: Bone and Joint Infection  Day of Therapy: 3 (started on 2023)  Current vancomycin regimen:  1000 mg IV q12h  Current vancomycin monitoring method: AUC  Current vancomycin therapeutic monitoring goal: 400-600 mg*h/L    [employer] Prediction of Current Vancomycin Regimen  Regimen: 1000 mg IV every 12 hours.  Start time: 20:34 on 2023  Exposure target: AUC24 (range)400-600 mg/L.hr   AUC24,ss: 316 mg/L.hr  Probability of AUC24 > 400: 11 %  Ctrough,ss: 7.5 mg/L  Probability of Ctrough,ss > 20: 0 %  Probability of nephrotoxicity (Lodise LINUS ): 4 %      Current estimated CrCl = Estimated Creatinine Clearance: 119.7 mL/min (based on SCr of 0.74 mg/dL).    Creatinine for last 3 days  2023:  7:40 AM Creatinine 0.81 mg/dL  2023:  6:41 AM Creatinine 0.74 mg/dL  5/3/2023:  6:43 AM Creatinine 0.74 mg/dL    Recent Vancomycin Levels (past 3 days)  5/3/2023:  7:17 PM Vancomycin 5.3 ug/mL    Vancomycin IV Administrations (past 72 hours)                   vancomycin (VANCOCIN) 1000 mg in dextrose 5% 200 mL PREMIX (mg) 1,000 mg New Bag 23 0754     1,000 mg New Bag 23     1,000 mg New Bag  08    vancomycin (VANCOCIN) 2,000 mg in 0.9% NaCl 500 mL intermittent infusion (mg) 2,000 mg New Bag 23 194                Nephrotoxins and other renal medications (From now, onward)    Start     Dose/Rate Route Frequency Ordered Stop    23 0800  vancomycin (VANCOCIN) 1000 mg in dextrose 5% 200 mL PREMIX         1,000 mg  200 mL/hr over 1 Hours Intravenous EVERY 12 HOURS 23               Contrast Orders - past 72 hours (72h ago, onward)    Start     Dose/Rate Route Frequency Stop    23 0030  gadobutrol (GADAVIST) injection 8 mL         8 mL Intravenous ONCE 23 0020    23 1130  iopamidol (ISOVUE-M 200) solution 10 mL         10 mL  INTRA-ARTICULAR ONCE 05/01/23 1226          Interpretation of levels and current regimen:  Vancomycin level is reflective of AUC less than 400    Has serum creatinine changed greater than 50% in last 72 hours: No    Urine output:  unable to determine    Renal Function: Stable    [employer] Prediction of Planned New Vancomycin Regimen  Regimen: 1500 mg IV every 12 hours.  Start time: 20:34 on 05/03/2023  Exposure target: AUC24 (range)400-600 mg/L.hr   AUC24,ss: 474 mg/L.hr  Probability of AUC24 > 400: 81 %  Ctrough,ss: 11.6 mg/L  Probability of Ctrough,ss > 20: 2 %  Probability of nephrotoxicity (Lodise LINUS 2009): 7 %        Plan:  1. Increase Dose to 1500 mg IV q12h.  2. Vancomycin monitoring method: AUC  3. Vancomycin therapeutic monitoring goal: 400-600 mg*h/L  4. Pharmacy will check vancomycin levels as appropriate in 1-3 Days.  5. Serum creatinine levels will be ordered a minimum of twice weekly.    Puja Hutchins, AnicetoD

## 2023-05-04 NOTE — PROGRESS NOTES
[facility]    Medicine Progress Note - Hospitalist Service    Date of Admission:  4/30/2023    Assessment & Plan       left Shoulder  Abscess  Septic athritis Suspected ?  Patient presenting with a 1 week history of left shoulder pain with swelling and warmth.  Additionally he has a likely sinus tract on the lateral posterior aspect of the shoulder which she reports was draining pus earlier this week.  White count mildly elevated but an elevated CRP.  X-ray of the shoulder could represent chronic degenerative changes versus infection.  Case was discussed with orthopedic surgery in the emergency room who recommended getting a joint aspiration prior to starting antibiotics.  Patient is not septic does not require stat antibiotics at this time.  IR was consulted for arthrocentesis.  -S/P IR athrocentesis on 5/1/23 Follow synovial fluid testing  - ortho consulted and undergoing Inscion and drainage  -antibiotics vancomycin and ceftixone started on 5/1/23  -Not enough fluid for cell count and crystals   -Pain control as needed  MRI    Peripherally enhancing fluid collection in the left axilla, 4.2 x 4.6 x 6.8 cm, suspicious for an abscess.  2.  Additional thin tubular fluid collection in the posterior subcutaneous tissue of the shoulder could also represent an abscess. This does not appear to communicate with the dominant axillary collection.  3.  No glenohumeral joint effusion.  4.  Severe degenerative changes of the left glenohumeral joint. Chronic rotator cuff tear and postsurgical changes.  Underwent I and D by orthopedics on 5/3/23    Tissue cultures growing Staph Aurues    Post op day 1 s/p I and D    Plan  -ID consult  -Pain control  -DVT as per ortho  -PT and OT             Primary prevention of CAD  The patient takes a baby aspirin for primary prevention.   Hold aspirin           Diet: Advance Diet as Tolerated: Regular Diet Adult    DVT Prophylaxis: Pneumatic Compression Devices  Leanne  Catheter: Not present  Lines: None     Cardiac Monitoring: None  Code Status: Full Code      Clinically Significant Risk Factors                                 Disposition Plan      Expected Discharge Date: 05/05/2023        Discharge Comments: Possible surgery          Nickie Torres MD  Hospitalist Service  [facility]  Securely message with [employer] (more info)  Text page via [employer] Paging/Directory   ______________________________________________________________________    Interval History    Pain well controlled  No chest pain  Pain well controlled     Physical Exam   Vital Signs: Temp: 98  F (36.7  C) Temp src: Oral BP: 122/75 Pulse: 78   Resp: 16 SpO2: 96 % O2 Device: None (Room air) Oxygen Delivery: 2 LPM  Weight: 185 lbs 0 oz  Physical Exam  Cardiovascular:      Rate and Rhythm: Normal rate and regular rhythm.      Heart sounds: Normal heart sounds.   Pulmonary:      Effort: Pulmonary effort is normal. No respiratory distress.   Abdominal:      Palpations: Abdomen is soft.      Tenderness: There is no abdominal tenderness.   Musculoskeletal:      Right lower leg: No edema.      Left lower leg: No edema.      Comments: Left shoulder covered with surgical dressing  Drains +       Medical Decision Making       Data     I have personally reviewed the following data over the past 24 hrs:    N/A  \   12.2 (L)   / N/A     N/A N/A N/A /  107 (H)   N/A N/A 0.82 \

## 2023-05-04 NOTE — PROGRESS NOTES
Patient had I&D today. Independent in room. Tolerating regular diet. Adequate I/O. Dressing reinforced with tape otherwise CDI. PIV infusing. Waiting for vancomycin level before next dose. Pain managed with PRN tramadol and tylenol this shift.

## 2023-05-04 NOTE — PROGRESS NOTES
Patient is independent in room and thorne. Adequate I/O - tolerating regular diet. Hemovac x2 intact. Dressing was changed by surgery team this AM. CDI. PIV infusing w/ intermittent antibx. Pain managed with scheduled tylenol and cold packs.

## 2023-05-04 NOTE — CONSULTS
[facility]    Infectious Disease Consultation     Date of Admission:  4/30/2023  Date of Consult (When I saw the patient): 05/04/23    Assessment & Plan   Simone Mercedes is a 63 year old male who was admitted on 4/30/2023.     Impression:  1. 63 y.o male  who presents with  left shoulder pain concerning for infection.    2. History of chronic wound over the posterior aspect of his shoulder which has been draining for over a year.  He reports he thought it was a small superficial infection but it has never really healed.    3. He presented to the hospital after noticing increased pain in his axillary region near his pec. This area has been enlarging in size and becoming more painful.  4. S/p: Left upper extremity irrigation and excisional debridement of abscess. Left upper extremity irrigation and excisional debridement of chronic posterior wound  5. Cultures showing staph aureus pending CYNTHIA   6. Currently on vanco and ceftriaxone     Recommendations:   Continue on the current antibiotics   I will follow up on the pending cultures.       Alfa Cuevas MD    Reason for Consult   Reason for consult: I was asked to evaluate this patient for septic shoulder     Primary Care Physician   Physician No Ref-Primary    Chief Complaint   Shoulder pain     History is obtained from the patient and medical records    History of Present Illness   Simone Mercedes is a 63 year old male admitted with left shoulder pain which has a chronic wound which started to drain pus.   Ongoing problem for about an yr.  actually the shoulder has been a problem for yrs, had an accident many decades ago which led to shoulder surgery the shoulder has been chronically dislocated since then     Past Medical History   I have reviewed this patient's medical history and updated it with pertinent information if needed.   No past medical history on file.    Past Surgical History   I have reviewed this patient's surgical  history and updated it with pertinent information if needed.  Past Surgical History:   Procedure Laterality Date     IRRIGATION AND DEBRIDEMENT SHOULDER, COMBINED Left 5/3/2023    Procedure: LEFT SHOULDER ABSCESS IRRIGATION AND DEBRIDEMENT, LEFT SHOULDER CHRONIC WOUND IRRIGATION AND DEBRIDEMENT;  Surgeon: Carlitos Garza MD;  Location:  OR       Prior to Admission Medications   Prior to Admission Medications   Prescriptions Last Dose Informant Patient Reported? Taking?   acetaminophen (TYLENOL) 500 MG tablet 4/30/2023 at 4 pm Self Yes Yes   Sig: Take 1,000 mg by mouth every 6 hours as needed for mild pain   aspirin 81 MG EC tablet 4/30/2023 at 4pm Self Yes Yes   Sig: Take 81 mg by mouth daily   ibuprofen (ADVIL/MOTRIN) 200 MG tablet 4/30/2023 at 4 pm Self Yes Yes   Sig: Take 400 mg by mouth every 6 hours as needed for pain      Facility-Administered Medications: None     Allergies   No Known Allergies    Immunization History   Immunization History   Administered Date(s) Administered     COVID-19 Vaccine ([employer]) 09/28/2021       Social History   I have reviewed this patient's social history and updated it with pertinent information if needed. Simone Mercedes      Family History   I have reviewed this patient's family history and updated it with pertinent information if needed.   No family history on file.    Review of Systems   The 10 point Review of Systems is negative    Physical Exam   Temp: 98  F (36.7  C) Temp src: Oral BP: 122/75 Pulse: 78   Resp: 16 SpO2: 96 % O2 Device: None (Room air) Oxygen Delivery: 2 LPM  Vital Signs with Ranges  Temp:  [97.2  F (36.2  C)-98  F (36.7  C)] 98  F (36.7  C)  Pulse:  [] 78  Resp:  [12-16] 16  BP: (122-144)/(70-79) 122/75  SpO2:  [92 %-97 %] 96 %  185 lbs 0 oz  Body mass index is 24.41 kg/m .    GENERAL APPEARANCE:  awake  EYES: Eyes grossly normal to inspection  NECK: no adenopathy  RESP: lungs clear   CV: regular rates and rhythm  LYMPHATICS: normal  ant/post cervical and supraclavicular nodes  ABDOMEN: soft, nontender  MS: left shoulder has dressing on and drain in place   SKIN: no suspicious lesions or rashes        Data   Lab Results   Component Value Date    WBC 13.0 (H) 05/04/2023    HGB 12.2 (L) 05/04/2023    HGB 12.2 (L) 05/04/2023    HCT 37.9 (L) 05/04/2023     05/04/2023     05/01/2023    POTASSIUM 3.8 05/01/2023    CHLORIDE 101 05/01/2023    CO2 23 05/01/2023    BUN 10.1 05/01/2023    CR 0.82 05/04/2023     (H) 05/04/2023    SED 25 (H) 04/30/2023    AST 16 05/01/2023    ALT 16 05/01/2023    ALKPHOS 49 05/01/2023    BILITOTAL 0.4 05/01/2023     No results for input(s): CULT in the last 168 hours.  No lab results found.    Invalid input(s): UC       All cultures:  Recent Labs   Lab 05/03/23  1446 05/03/23  1443 05/03/23  1433 05/03/23  1432 05/03/23  1426 05/03/23  1417 05/01/23  1242 05/01/23  1201 04/30/23  1836   CULTURE No growth, less than 1 day Culture in progress Culture in progress Culture in progress Culture in progress Culture in progress Culture in progress  Isolated in broth only Staphylococcus aureus* No growth after 2 days  No anaerobic organisms isolated after 2 days No growth after 3 days  No growth after 3 days      Blood culture:  Results for orders placed or performed during the hospital encounter of 04/30/23   Blood Culture Arm, Left    Specimen: Arm, Left; Blood   Result Value Ref Range    Culture No growth after 3 days    Blood Culture Hand, Right    Specimen: Hand, Right; Blood   Result Value Ref Range    Culture No growth after 3 days       Urine culture:  No results found for this or any previous visit.

## 2023-05-04 NOTE — PROGRESS NOTES
Orthopedics    Simone is doing well following his surgery.  He reports his pain is much improved.  He denies any fevers or chills.    On exam, he is resting comfortably.  Dressing is intact.  Minimal output from his 2 drains.  He has limited motion of his shoulder secondary to his chronic arthritis but overall less pain with shoulder motion.  He is neurovascular intact distally.    Simone's cultures are growing GPC in clusters.  He is currently on vancomycin and ceftriaxone.  Awaiting further speciation and cultures for final antibiotic plan per infectious disease.    Of note, his abscess was outside of his shoulder joint.  There was no obvious connection between the shoulder joint and the abscess.  His posterior wounds were also outside of the shoulder joint.  A new shoulder joint aspirate was obtained while in surgery.  The shoulder joint was opened via the rotator cuff interval and irrigated as well.    Plan for follow-up in approximately 1 to 2 weeks for a wound check.  His Hemovacs can be removed tomorrow if output continues to be low.  Follow-up information was given.  All questions were answered.    MATTHEW DECKER MD

## 2023-05-04 NOTE — PROGRESS NOTES
Orthopedic Surgery  Simone Mercedes  05/04/2023     Admit Date:  4/30/2023  POD: 1 Day Post-Op   Procedure(s):  LEFT SHOULDER ABSCESS IRRIGATION AND DEBRIDEMENT, LEFT SHOULDER CHRONIC WOUND IRRIGATION AND DEBRIDEMENT     Patient resting comfortably in chair.   Pain improved.   Tolerating oral intake.    Denies nausea or vomiting  Denies chest pain or shortness of breath.     Temp:  [97.2  F (36.2  C)-98  F (36.7  C)] 98  F (36.7  C)  Pulse:  [] 78  Resp:  [12-16] 16  BP: (122-159)/(70-96) 122/75  SpO2:  [92 %-97 %] 96 %    Alert and oriented.  Dressings are peeling and there is inferior drainage.  The drains were reinforced and drains taped to skin. Drain started functioning again.  New dressings applied.      ROM limited left shoulder  Able to flex and extend fingers without difficulty  Full sensation to light touch  Radial pulse present  Capillary refill <2 seconds    Labs:  Recent Labs   Lab Test 05/04/23  0642 05/01/23  0740 04/30/23  1647   WBC  --  10.2 11.9*   HGB 12.2* 14.4 14.6   PLT  --  270 301     No lab results found.  Recent Labs   Lab Test 05/02/23  0641 04/30/23  1647   CRPI 140.07* 127.71*     1. Plan:   ASA 81mg daily for DVT prophylaxis.   Abx per ID      Mobilize with PT/OT    WBAT Left UE,      Continue current pain regiment.   Dressings: Dressing change POD #2 or 3.  Drains to be removed POD #2 or 3 when drainage <30ml per shift.     Follow-up: 2 weeks post-op with 2 weeks team    2. Disposition   Anticipate d/c to home 1-2 days pending Abx course.     Radha Aguayo PA-C

## 2023-05-05 LAB
BACTERIA ABSC ANAEROBE+AEROBE CULT: ABNORMAL
BACTERIA BLD CULT: NO GROWTH
BACTERIA BLD CULT: NO GROWTH
BACTERIA TISS BX CULT: ABNORMAL
CREAT SERPL-MCNC: 0.87 MG/DL (ref 0.67–1.17)
GFR SERPL CREATININE-BSD FRML MDRD: >90 ML/MIN/1.73M2
GLUCOSE BLDC GLUCOMTR-MCNC: 101 MG/DL (ref 70–99)
HGB BLD-MCNC: 11.5 G/DL (ref 13.3–17.7)
VANCOMYCIN SERPL-MCNC: 11.6 UG/ML

## 2023-05-05 PROCEDURE — 99232 SBSQ HOSP IP/OBS MODERATE 35: CPT | Performed by: INTERNAL MEDICINE

## 2023-05-05 PROCEDURE — 258N000003 HC RX IP 258 OP 636: Performed by: HOSPITALIST

## 2023-05-05 PROCEDURE — 99232 SBSQ HOSP IP/OBS MODERATE 35: CPT | Performed by: STUDENT IN AN ORGANIZED HEALTH CARE EDUCATION/TRAINING PROGRAM

## 2023-05-05 PROCEDURE — 250N000013 HC RX MED GY IP 250 OP 250 PS 637: Performed by: STUDENT IN AN ORGANIZED HEALTH CARE EDUCATION/TRAINING PROGRAM

## 2023-05-05 PROCEDURE — 120N000001 HC R&B MED SURG/OB

## 2023-05-05 PROCEDURE — 82565 ASSAY OF CREATININE: CPT | Performed by: HOSPITALIST

## 2023-05-05 PROCEDURE — 250N000011 HC RX IP 250 OP 636: Performed by: INTERNAL MEDICINE

## 2023-05-05 PROCEDURE — 85018 HEMOGLOBIN: CPT

## 2023-05-05 PROCEDURE — 250N000013 HC RX MED GY IP 250 OP 250 PS 637

## 2023-05-05 PROCEDURE — 80202 ASSAY OF VANCOMYCIN: CPT | Performed by: HOSPITALIST

## 2023-05-05 PROCEDURE — 36415 COLL VENOUS BLD VENIPUNCTURE: CPT

## 2023-05-05 PROCEDURE — 250N000011 HC RX IP 250 OP 636: Performed by: HOSPITALIST

## 2023-05-05 RX ORDER — CEFAZOLIN SODIUM 2 G/100ML
2 INJECTION, SOLUTION INTRAVENOUS EVERY 8 HOURS
Status: DISCONTINUED | OUTPATIENT
Start: 2023-05-05 | End: 2023-05-07 | Stop reason: HOSPADM

## 2023-05-05 RX ORDER — POLYETHYLENE GLYCOL 3350 17 G/17G
17 POWDER, FOR SOLUTION ORAL DAILY
Qty: 510 G | Refills: 0 | Status: SHIPPED | OUTPATIENT
Start: 2023-05-05

## 2023-05-05 RX ORDER — OXYCODONE HYDROCHLORIDE 5 MG/1
5 TABLET ORAL EVERY 4 HOURS PRN
Qty: 8 TABLET | Refills: 0 | Status: SHIPPED | OUTPATIENT
Start: 2023-05-05

## 2023-05-05 RX ORDER — CEFAZOLIN SODIUM 1 G/3ML
2 INJECTION, POWDER, FOR SOLUTION INTRAMUSCULAR; INTRAVENOUS EVERY 8 HOURS
Qty: 1 EACH | DISCHARGE
Start: 2023-05-05 | End: 2023-05-26

## 2023-05-05 RX ORDER — ACETAMINOPHEN 325 MG/1
975 TABLET ORAL EVERY 8 HOURS PRN
Qty: 100 TABLET | Refills: 0 | Status: SHIPPED | OUTPATIENT
Start: 2023-05-05

## 2023-05-05 RX ADMIN — VANCOMYCIN HYDROCHLORIDE 1500 MG: 10 INJECTION, POWDER, LYOPHILIZED, FOR SOLUTION INTRAVENOUS at 21:11

## 2023-05-05 RX ADMIN — OXYCODONE HYDROCHLORIDE 10 MG: 5 TABLET ORAL at 04:40

## 2023-05-05 RX ADMIN — SENNOSIDES AND DOCUSATE SODIUM 1 TABLET: 50; 8.6 TABLET ORAL at 20:16

## 2023-05-05 RX ADMIN — CEFAZOLIN SODIUM 2 G: 2 INJECTION, SOLUTION INTRAVENOUS at 20:16

## 2023-05-05 RX ADMIN — Medication 250 MG: at 23:13

## 2023-05-05 RX ADMIN — ACETAMINOPHEN 975 MG: 325 TABLET ORAL at 04:40

## 2023-05-05 RX ADMIN — CEFAZOLIN SODIUM 2 G: 2 INJECTION, SOLUTION INTRAVENOUS at 13:04

## 2023-05-05 RX ADMIN — POLYETHYLENE GLYCOL 3350 17 G: 17 POWDER, FOR SOLUTION ORAL at 09:01

## 2023-05-05 RX ADMIN — ASPIRIN 81 MG: 81 TABLET, COATED ORAL at 09:01

## 2023-05-05 RX ADMIN — ASPIRIN 81 MG: 81 TABLET, COATED ORAL at 20:16

## 2023-05-05 RX ADMIN — ACETAMINOPHEN 975 MG: 325 TABLET ORAL at 13:04

## 2023-05-05 RX ADMIN — OXYCODONE HYDROCHLORIDE 10 MG: 5 TABLET ORAL at 18:52

## 2023-05-05 RX ADMIN — Medication 250 MG: at 09:01

## 2023-05-05 RX ADMIN — ACETAMINOPHEN 975 MG: 325 TABLET ORAL at 20:16

## 2023-05-05 RX ADMIN — VANCOMYCIN HYDROCHLORIDE 1500 MG: 10 INJECTION, POWDER, LYOPHILIZED, FOR SOLUTION INTRAVENOUS at 09:12

## 2023-05-05 RX ADMIN — SENNOSIDES AND DOCUSATE SODIUM 1 TABLET: 50; 8.6 TABLET ORAL at 09:01

## 2023-05-05 RX ADMIN — OXYCODONE HYDROCHLORIDE 10 MG: 5 TABLET ORAL at 09:07

## 2023-05-05 ASSESSMENT — ACTIVITIES OF DAILY LIVING (ADL)
ADLS_ACUITY_SCORE: 18

## 2023-05-05 NOTE — PROGRESS NOTES
Patient vital signs are at baseline: Yes  Patient able to ambulate as they were prior to admission or with assist devices provided by therapies during their stay:  Yes  Patient MUST void prior to discharge: Yes  Patient able to tolerate oral intake: Yes  Pain has adequate pain control using Oral analgesics:  Yes  Does patient have an identified : Yes  Has goal D/C date and time been discussed with patient:  TBD    Pt is alert and oriented x 4, PIV SL, independent, tolerating regular diet. CMS . No output for left shoulder Hemovac.  PRN oxycodone given x 2 along with schedule Tylenol for pain. Discharge pending.

## 2023-05-05 NOTE — PROGRESS NOTES
Orthopedic Surgery  Simone Mercedes  05/05/2023     Admit Date:  4/30/2023  POD: 2 Days Post-Op   Procedure(s):  LEFT SHOULDER ABSCESS IRRIGATION AND DEBRIDEMENT, LEFT SHOULDER CHRONIC WOUND IRRIGATION AND DEBRIDEMENT     Patient resting comfortably in chair. Minimal drainage of drains and looks forward to having these removed and shower for hygiene.   Pain improved.   Tolerating oral intake.    Denies nausea or vomiting  Denies chest pain or shortness of breath.     Temp:  [97.8  F (36.6  C)-98.2  F (36.8  C)] 97.9  F (36.6  C)  Pulse:  [76-79] 79  Resp:  [16] 16  BP: (123-155)/(71-85) 155/85  SpO2:  [90 %-96 %] 96 %    Alert and oriented.  Dressings removed, 2 drains removed. No complication   ROM limited left shoulder  Able to flex and extend fingers without difficulty  Full sensation to light touch  Radial pulse present  Capillary refill <2 seconds    Labs:  Recent Labs   Lab Test 05/05/23  0721 05/04/23  0642 05/01/23  0740 04/30/23  1647   WBC  --  13.0* 10.2 11.9*   HGB 11.5* 12.2*  12.2* 14.4 14.6   PLT  --  290 270 301     No lab results found.  Recent Labs   Lab Test 05/02/23  0641 04/30/23  1647   CRPI 140.07* 127.71*     1. Plan:   ASA 81mg daily for DVT prophylaxis.   Abx per ID      Mobilize with PT/OT    WBAT Left UE,      Continue current pain regimen   Dressings: Dressing change POD #3   Follow-up: 2 weeks post-op with 2 weeks team    2. Disposition   Anticipate d/c to home 1-2 days pending Abx course.     Ayana Shields PA-C

## 2023-05-05 NOTE — PHARMACY-VANCOMYCIN DOSING SERVICE
Pharmacy Vancomycin Note  Date of Service May 5, 2023  Patient's  1960   63 year old, male    Indication: Bone and Joint Infection  Day of Therapy: 5  Current vancomycin regimen:  1500 mg IV q12h  Current vancomycin monitoring method: AUC  Current vancomycin therapeutic monitoring goal: 400-600 mg*h/L    InsightRX Prediction of Current Vancomycin Regimen  Regimen: 1500 mg IV every 12 hours.  Start time: 20:34 on 2023  Exposure target: AUC24 (range)400-600 mg/L.hr   AUC24,ss: 474 mg/L.hr  Probability of AUC24 > 400: 81 %  Ctrough,ss: 11.6 mg/L  Probability of Ctrough,ss > 20: 2 %  Probability of nephrotoxicity (Lodise LINUS ): 7 %    Current estimated CrCl = Estimated Creatinine Clearance: 101.8 mL/min (based on SCr of 0.87 mg/dL).    Creatinine for last 3 days  5/3/2023:  6:43 AM Creatinine 0.74 mg/dL  2023:  6:42 AM Creatinine 0.82 mg/dL  2023:  7:21 AM Creatinine 0.87 mg/dL    Recent Vancomycin Levels (past 3 days)  5/3/2023:  7:17 PM Vancomycin 5.3 ug/mL  2023:  7:21 AM Vancomycin 11.6 ug/mL    Vancomycin IV Administrations (past 72 hours)                   vancomycin (VANCOCIN) 1,500 mg in 0.9% NaCl 250 mL intermittent infusion (mg) 1,500 mg New Bag 23 0912     1,500 mg New Bag 23 2120     1,500 mg New Bag  0928     1,500 mg New Bag 23 2222    vancomycin (VANCOCIN) 1000 mg in dextrose 5% 200 mL PREMIX (mg) 1,000 mg New Bag 23 0754     1,000 mg New Bag 23                Nephrotoxins and other renal medications (From now, onward)    Start     Dose/Rate Route Frequency Ordered Stop    23  vancomycin (VANCOCIN) 1,500 mg in 0.9% NaCl 250 mL intermittent infusion         1,500 mg  over 90 Minutes Intravenous EVERY 12 HOURS 23 204               Contrast Orders - past 72 hours (72h ago, onward)    Start     Dose/Rate Route Frequency Stop    23 0030  gadobutrol (GADAVIST) injection 8 mL         8 mL Intravenous ONCE 23 0020           Interpretation of levels and current regimen:  Vancomycin level is reflective of -600    Has serum creatinine changed greater than 50% in last 72 hours: No    Urine output:  unable to determine    Renal Function: Stable    InsightRX Prediction of Planned New Vancomycin Regimen  Loading dose: N/A  Regimen: 1500 mg IV every 12 hours.  Start time: 21:12 on 05/05/2023  Exposure target: AUC24 (range)400-600 mg/L.hr   AUC24,ss: 490 mg/L.hr  Probability of AUC24 > 400: 90 %  Ctrough,ss: 11.6 mg/L  Probability of Ctrough,ss > 20: 0 %  Probability of nephrotoxicity (Lodise LINUS 2009): 7 %      Plan:  1. Continue Current Dose  2. Vancomycin monitoring method: AUC  3. Vancomycin therapeutic monitoring goal: 400-600 mg*h/L  4. Pharmacy will check vancomycin levels as appropriate in 3-5 Days.  5. Serum creatinine levels will be ordered a minimum of twice weekly.    Denise Gooden

## 2023-05-05 NOTE — PROGRESS NOTES
[facility]    Medicine Progress Note - Hospitalist Service    Date of Admission:  4/30/2023    Assessment & Plan      # left Shoulder  Abscess  # Septic athritis  Patient presenting with a 1 week history of left shoulder pain with swelling and warmth.  Additionally he has a likely sinus tract on the lateral posterior aspect of the shoulder which she reports was draining pus earlier this week.  White count mildly elevated but an elevated CRP.  X-ray of the shoulder could represent chronic degenerative changes versus infection.  Case was discussed with orthopedic surgery in the emergency room who recommended getting a joint aspiration prior to starting antibiotics.  Patient is not septic does not require stat antibiotics at this time.  IR was consulted for arthrocentesis.  -S/P IR athrocentesis on 5/1/23 Follow synovial fluid testing  - ortho consulted and undergoing Inscion and drainage  -antibiotics vancomycin and ceftixone started on 5/1/23  -Not enough fluid for cell count and crystals   -Pain control as needed  MRI    Peripherally enhancing fluid collection in the left axilla, 4.2 x 4.6 x 6.8 cm, suspicious for an abscess.  2.  Additional thin tubular fluid collection in the posterior subcutaneous tissue of the shoulder could also represent an abscess. This does not appear to communicate with the dominant axillary collection.  3.  No glenohumeral joint effusion.  4.  Severe degenerative changes of the left glenohumeral joint. Chronic rotator cuff tear and postsurgical changes.  Underwent I and D by orthopedics on 5/3/23    Tissue cultures growing Staph Aurues    Discussed with infectious disease.  Synovial fluid cultures also growing Staph aureus.  Patient also underwent incision and drainage and washout of the of the joint by orthopedics.    Plan  -Awaiting sensitivities of Staph aureus.  Infectious disease Dr. Cuevas has ordered Ancef in the discharge chart.  Will closely follow CYNTHIA  and sensitivities if growing MRSA will call infectious disease on-call over the weekend  -DVT prophylaxis aspirin 81 mg as per orthopedics  -Pain management as per orthopedics  -Continue vancomycin and cefazolin while inpatient awaiting Staph aureus sensitivities    # Lower extremity edema  No pulmonary or Cardiac symptoms  -Compression stocking  -Elevation of leg             #Primary prevention of CAD  The patient takes a baby aspirin for primary prevention  Continue aspirn      Family- updated wife at bedside           Diet: Advance Diet as Tolerated: Regular Diet Adult    DVT Prophylaxis: Pneumatic Compression Devices  Perdomo Catheter: Not present  Lines: None     Cardiac Monitoring: None  Code Status: Full Code      Clinically Significant Risk Factors                                 Disposition Plan      Expected Discharge Date: 05/06/2023        Discharge Comments: Possible surgery          Nickie Torres MD  Hospitalist Service  [facility]  Securely message with [employer] (more info)  Text page via [employer] Paging/Directory   ______________________________________________________________________    Interval History    Pain well controlled  No chest pain  Pain well controlled   Patient was having bilateral lower extremity swelling     Does not have any chest pain, shortness of breath or orthopnea    Physical Exam   Vital Signs: Temp: 97.9  F (36.6  C) Temp src: Oral BP: (!) 155/85 Pulse: 79   Resp: 16 SpO2: 96 % O2 Device: None (Room air)    Weight: 185 lbs 0 oz  Physical Exam  Cardiovascular:      Rate and Rhythm: Normal rate and regular rhythm.      Heart sounds: Normal heart sounds.   Pulmonary:      Effort: Pulmonary effort is normal. No respiratory distress.   Abdominal:      Palpations: Abdomen is soft.      Tenderness: There is no abdominal tenderness.   Musculoskeletal:      Right lower leg: No edema.      Left lower leg: No edema.      Comments: Left shoulder swelling  with surgical scare no redness or purulence around scar site       Medical Decision Making       Data     I have personally reviewed the following data over the past 24 hrs:    N/A  \   11.5 (L)   / N/A     N/A N/A N/A /  101 (H)   N/A N/A 0.87 \

## 2023-05-05 NOTE — PROGRESS NOTES
[facility]    Infectious Disease Progress Note    Date of Service (when I saw the patient): 05/05/2023     Assessment & Plan   Simone Mercedes is a 63 year old male who was admitted on 4/30/2023.     Impression:  1. 63 y.o male  who presents with  left shoulder pain concerning for infection.    2. History of chronic wound over the posterior aspect of his shoulder which has been draining for over a year.  He reports he thought it was a small superficial infection but it has never really healed.    3. He presented to the hospital after noticing increased pain in his axillary region near his pec. This area has been enlarging in size and becoming more painful.  4. S/p: Left upper extremity irrigation and excisional debridement of abscess. Left upper extremity irrigation and excisional debridement of chronic posterior wound  5. Cultures showing staph aureus pending CYNTHIA   6. Currently on vanco and ceftriaxone      Recommendations:     Synovial fluid cultures also growing staph aureus, discussed with ortho I and D on the joint was also done on 5/3     I am planning IV antibiotics for d.c.   Will need PICC   Based on CYNTHIA on staph aureus either Vanco or Ancef.     Tentative orders for ancef are in the chart, if MRSA please call ID over the weekend.       Alfa Cuevas MD    Interval History   Tolerating antibiotics ok   No new rashes or issues with antibiotics   Labs reviewed   No changes to past medical, social or family history   Afebrile   A 10 point ROS was done and is negative other than noted in the interval history above     Physical Exam   Temp: 97.9  F (36.6  C) Temp src: Oral BP: (!) 155/85 Pulse: 79   Resp: 16 SpO2: 96 % O2 Device: None (Room air)    Vitals:    04/30/23 1634 04/30/23 1927   Weight: 82 kg (180 lb 12.4 oz) 83.9 kg (185 lb)     Vital Signs with Ranges  Temp:  [97.8  F (36.6  C)-98.2  F (36.8  C)] 97.9  F (36.6  C)  Pulse:  [76-79] 79  Resp:  [16] 16  BP: (123-155)/(71-85)  155/85  SpO2:  [90 %-96 %] 96 %    Constitutional: Awake, alert, cooperative, no apparent distress  Lungs: Clear to auscultation bilaterally, no crackles or wheezing  Cardiovascular: Regular rate and rhythm, normal S1 and S2, and no murmur noted  Abdomen: Normal bowel sounds, soft, non-distended, non-tender  Skin: No rashes, no cyanosis, no edema  Other:    Medications     lactated ringers Stopped (05/04/23 0000)       acetaminophen  975 mg Oral Q8H     aspirin  81 mg Oral BID     cefTRIAXone  2 g Intravenous Q24H     polyethylene glycol  17 g Oral Daily     saccharomyces boulardii  250 mg Oral BID     senna-docusate  1 tablet Oral BID     sodium chloride (PF)  3 mL Intracatheter Q8H     sodium chloride (PF)  3 mL Intracatheter Q8H     vancomycin  1,500 mg Intravenous Q12H       Data   All microbiology laboratory data reviewed.  Recent Labs   Lab Test 05/05/23  0721 05/04/23  0642 05/01/23  0740 04/30/23  1647   WBC  --  13.0* 10.2 11.9*   HGB 11.5* 12.2*  12.2* 14.4 14.6   HCT  --  37.9* 43.7 44.5   MCV  --  90 89 89   PLT  --  290 270 301     Recent Labs   Lab Test 05/05/23  0721 05/04/23  0642 05/03/23  0643   CR 0.87 0.82 0.74     Recent Labs   Lab Test 04/30/23  1647   SED 25*     No lab results found.    Invalid input(s):     All cultures:  Recent Labs   Lab 05/03/23  1446 05/03/23  1443 05/03/23  1433 05/03/23  1432 05/03/23  1426 05/03/23  1417 05/01/23  1242 05/01/23  1201 04/30/23  1836   CULTURE No growth after 1 day  No anaerobic organisms isolated after 1 day No anaerobic organisms isolated after 1 day  Culture in progress  1+ Gram positive cocci in clusters* No anaerobic organisms isolated after 1 day  Culture in progress  1+ Gram positive cocci in clusters* No anaerobic organisms isolated after 1 day  Culture in progress  2+ Staphylococcus aureus* 3+ Staphylococcus aureus*  No anaerobic organisms isolated after 1 day 4+ Staphylococcus aureus*  No anaerobic organisms isolated after 1 day  Isolated in broth only Staphylococcus aureus*  Isolated in broth only Staphylococcus aureus* No growth after 3 days  No anaerobic organisms isolated after 3 days No growth after 4 days  No growth after 4 days      Blood culture:  Results for orders placed or performed during the hospital encounter of 04/30/23   Blood Culture Arm, Left    Specimen: Arm, Left; Blood   Result Value Ref Range    Culture No growth after 4 days    Blood Culture Hand, Right    Specimen: Hand, Right; Blood   Result Value Ref Range    Culture No growth after 4 days       Urine culture:  No results found for this or any previous visit.

## 2023-05-05 NOTE — PROGRESS NOTES
Patient had asked several times if he can go outside today. Patient was instructed not to leave the unit d/t safety concerns and risk of hemovacs being dislodged. Patient agreed until about 1745 when patient went down to the ED and was observed sitting on the bench outside. Patient was off unit for approximately 5 minutes. Patient agrees to stay on unit for the time being.

## 2023-05-06 LAB
BACTERIA SNV CULT: ABNORMAL
BACTERIA SNV CULT: ABNORMAL
BACTERIA SNV CULT: NO GROWTH
BACTERIA TISS BX CULT: ABNORMAL
CREAT SERPL-MCNC: 0.8 MG/DL (ref 0.67–1.17)
GFR SERPL CREATININE-BSD FRML MDRD: >90 ML/MIN/1.73M2
GRAM STAIN RESULT: NORMAL
GRAM STAIN RESULT: NORMAL

## 2023-05-06 PROCEDURE — 36415 COLL VENOUS BLD VENIPUNCTURE: CPT | Performed by: HOSPITALIST

## 2023-05-06 PROCEDURE — 120N000001 HC R&B MED SURG/OB

## 2023-05-06 PROCEDURE — 272N000432 HC KIT CATH IV 18 OR 20 G, POWERGLIDE BASIC

## 2023-05-06 PROCEDURE — 82565 ASSAY OF CREATININE: CPT | Performed by: HOSPITALIST

## 2023-05-06 PROCEDURE — 250N000009 HC RX 250: Performed by: HOSPITALIST

## 2023-05-06 PROCEDURE — 250N000013 HC RX MED GY IP 250 OP 250 PS 637: Performed by: STUDENT IN AN ORGANIZED HEALTH CARE EDUCATION/TRAINING PROGRAM

## 2023-05-06 PROCEDURE — 36569 INSJ PICC 5 YR+ W/O IMAGING: CPT

## 2023-05-06 PROCEDURE — 258N000003 HC RX IP 258 OP 636: Performed by: HOSPITALIST

## 2023-05-06 PROCEDURE — 250N000011 HC RX IP 250 OP 636: Performed by: INTERNAL MEDICINE

## 2023-05-06 PROCEDURE — 250N000011 HC RX IP 250 OP 636: Performed by: HOSPITALIST

## 2023-05-06 PROCEDURE — 05HB33Z INSERTION OF INFUSION DEVICE INTO RIGHT BASILIC VEIN, PERCUTANEOUS APPROACH: ICD-10-PCS | Performed by: HOSPITALIST

## 2023-05-06 PROCEDURE — 99232 SBSQ HOSP IP/OBS MODERATE 35: CPT | Performed by: HOSPITALIST

## 2023-05-06 PROCEDURE — 250N000013 HC RX MED GY IP 250 OP 250 PS 637

## 2023-05-06 RX ORDER — DOXYCYCLINE 100 MG/1
100 CAPSULE ORAL 2 TIMES DAILY
Qty: 56 CAPSULE | Refills: 0 | Status: SHIPPED | OUTPATIENT
Start: 2023-05-06 | End: 2023-06-03

## 2023-05-06 RX ORDER — POLYETHYLENE GLYCOL 3350 17 G/17G
17 POWDER, FOR SOLUTION ORAL DAILY
Qty: 510 G | Refills: 0 | Status: SHIPPED | OUTPATIENT
Start: 2023-05-06 | End: 2023-06-05

## 2023-05-06 RX ORDER — SACCHAROMYCES BOULARDII 250 MG
250 CAPSULE ORAL 2 TIMES DAILY
Qty: 60 CAPSULE | Refills: 0 | Status: SHIPPED | OUTPATIENT
Start: 2023-05-06 | End: 2023-06-05

## 2023-05-06 RX ADMIN — SENNOSIDES AND DOCUSATE SODIUM 1 TABLET: 50; 8.6 TABLET ORAL at 08:39

## 2023-05-06 RX ADMIN — CEFAZOLIN SODIUM 2 G: 2 INJECTION, SOLUTION INTRAVENOUS at 13:01

## 2023-05-06 RX ADMIN — CEFAZOLIN SODIUM 2 G: 2 INJECTION, SOLUTION INTRAVENOUS at 05:11

## 2023-05-06 RX ADMIN — LIDOCAINE HYDROCHLORIDE 1 ML: 10 INJECTION, SOLUTION INFILTRATION; PERINEURAL at 10:46

## 2023-05-06 RX ADMIN — ASPIRIN 81 MG: 81 TABLET, COATED ORAL at 20:26

## 2023-05-06 RX ADMIN — Medication 250 MG: at 08:39

## 2023-05-06 RX ADMIN — CEFAZOLIN SODIUM 2 G: 2 INJECTION, SOLUTION INTRAVENOUS at 19:44

## 2023-05-06 RX ADMIN — ASPIRIN 81 MG: 81 TABLET, COATED ORAL at 08:39

## 2023-05-06 RX ADMIN — Medication 250 MG: at 20:26

## 2023-05-06 RX ADMIN — SENNOSIDES AND DOCUSATE SODIUM 1 TABLET: 50; 8.6 TABLET ORAL at 20:26

## 2023-05-06 RX ADMIN — POLYETHYLENE GLYCOL 3350 17 G: 17 POWDER, FOR SOLUTION ORAL at 08:39

## 2023-05-06 RX ADMIN — ACETAMINOPHEN 975 MG: 325 TABLET ORAL at 05:11

## 2023-05-06 RX ADMIN — OXYCODONE HYDROCHLORIDE 10 MG: 5 TABLET ORAL at 05:11

## 2023-05-06 ASSESSMENT — ACTIVITIES OF DAILY LIVING (ADL)
ADLS_ACUITY_SCORE: 18

## 2023-05-06 NOTE — PROGRESS NOTES
Ortho Daily Progress Note  Pain controlled.  Denies cp or sob.  Adamant about leaving today 5/6/2023 at 5 pm whether discharged or not. Understands importance of abx infusion therapy but no longer wants to stay in hospital. Mentioned he is comfortable giving infusion abx to self at home as previously did 20 years ago.       Temp:  [98  F (36.7  C)-98.4  F (36.9  C)] 98.2  F (36.8  C)  Pulse:  [82-87] 85  Resp:  [16-18] 16  BP: (136-150)/(81-89) 150/86  SpO2:  [92 %-98 %] 96 %  Hemoglobin   Date Value Ref Range Status   05/05/2023 11.5 (L) 13.3 - 17.7 g/dL Final       Physical Exam:  Alert, appropriate, and following commands  Breathing easily without wheeze  Dressing/wound c/d/i, 5/5 df/pf/ehl, SILT, palpable dp pulse        A/P - 63 year old male s/p I&D of left shoulder.   DVT proph: ASA 81 mg daily  Abx: Vanco per ID. Planning for home infusion.   WBAT  Dispo: Home with infusion therapy/education    Follow up in 1 week for recheck with Dr. Garza.    Kenyatta Ferraro PA-C

## 2023-05-06 NOTE — CONSULTS
Care Management Initial Consult    General Information  Assessment completed with: Patient,    Type of CM/SW Visit: Initial Assessment    Primary Care Provider verified and updated as needed: Yes   Readmission within the last 30 days:        Reason for Consult: discharge planning  Advance Care Planning:            Communication Assessment  Patient's communication style: spoken language (English or Bilingual)    Hearing Difficulty or Deaf: no   Wear Glasses or Blind: no    Cognitive  Cognitive/Neuro/Behavioral: WDL  Level of Consciousness: alert  Arousal Level: opens eyes spontaneously  Orientation: oriented x 4             Living Environment:   People in home: spouse     Current living Arrangements: house      Able to return to prior arrangements: yes       Family/Social Support:  Care provided by: self, spouse/significant other  Provides care for: no one  Marital Status:   Wife          Description of Support System: Supportive, Involved         Current Resources:   Patient receiving home care services: No     Community Resources:    Equipment currently used at home: none  Supplies currently used at home:      Employment/Financial:  Employment Status: retired        Financial Concerns:             Does the patient's insurance plan have a 3 day qualifying hospital stay waiver?  No    Lifestyle & Psychosocial Needs:  Social Determinants of Health     Tobacco Use: Not on file   Alcohol Use: Not on file   Financial Resource Strain: Not on file   Food Insecurity: Not on file   Transportation Needs: Not on file   Physical Activity: Not on file   Stress: Not on file   Social Connections: Not on file   Intimate Partner Violence: Not on file   Depression: Not on file   Housing Stability: Not on file       Functional Status:  Prior to admission patient needed assistance:              Mental Health Status:          Chemical Dependency Status:                Values/Beliefs:  Spiritual, Cultural Beliefs, Jainism  Practices, Values that affect care:                 Additional Information:  Writer noted Consiult for home infusion services was placed today, Saturday 5/6.  Writer met with patient in his room and introduced self and role in discharge planning.  Patient stated he has been on IV antibiotic in the past about 20 years ago.  Writer informed patient of process of home infusion being set up.  First step is I referral and insurance benefit check.  Since it is Saturday, the benefit checks can be slower or sometimes not able to  Do at all, depending on the insurance co.  Writer will monitor this process closely in hopes of proceeding with this discharge.  Patient is not willing to stay beyond 5 PM today.  He stated he will return tomorrow for his infusions.  Writer explained it does not work that way.  I he makes the decision to leave at 5 PM and we  Do not have home IV antibiotics set up, he will end up missing doses.  He again stated he will leave at 5:00.  Writer explained he will need to have RN visit weekly and will be taught to self administer the Ance and patient is in agreement with that.    Will  Continue to follow for safe discharge; await benefit check for IV antibiotics before we can  Proceed with plan.    1218 update:  Per [facility] benefit check, [facility] is out of Network.  [facility] offered to send referral to [facility] on Monday which is in network for patient.  Writer emailed back and asked that the referral be made today as patient is adamant about discharging today at 5 PM.    1249:  Spoke with Itzel at [facility].  Faxed face sheet, order, H & P, ID notes Line info to [phone].  They will try to get things set up today, but need to verify patients insurance.    1500:  Writer spoke with Rex Nunez with [facility].  Mayra explained they will need to do a benefit check on Monday.  If they have the signed orders for the Ancef and other requested info writer faxed to them already the earliest they can start he home  infusion is tomorrow  Evening.  Writer explained patient is considered to be medically ready and insists on leaving today.  Mayra stated if pt is to leave AMA they will not service him.    Writer explained the info to patient and he would still like to leave today and family can pick him up.  Dr. Parker made aware of patients decision and that there is a script for doxy that printed & pt requested it be filled at [facility].  Beside RN and charge RN aware.    Writer will follow up with [facility] on 5/7 to see if they have everything needed to supply the Ancef tomorrow.  Patricia Worley RN

## 2023-05-06 NOTE — PLAN OF CARE
Goal Outcome Evaluation:       A&OX4. VSS on room air. Up IND. PIV SL in between antibiotics. Ambulating in the hallways ind. Pain rated 7-8/10 managing with oxy. Drain removed by ortho. Dressing to L shoulder CDI, changed by writer. showered this afternoon. Discharge pending.

## 2023-05-06 NOTE — DISCHARGE SUMMARY
[facility]  Hospitalist Discharge Summary      Date of Admission:  4/30/2023  Date of Discharge:  5/7/2023  Discharging Provider: Brain Parker MD  Discharge Service: Hospitalist Service    Discharge Diagnoses   Left shoulder abscess  Lower extremity edema  Elevated blood pressure without diagnosis of hypertension    Follow-ups Needed After Discharge   Follow-up Appointments     Follow-up and recommended labs and tests       Please call as soon as possible to make an appointment to be seen in Dr. Carlitos Garza's clinic at 1-2 weeks postop for a recheck of your   surgical site, possible repeat x-rays, and wound care. If you are at a TCU   at this time and they have x-ray capabilities, you may complete your wound   care and x-rays at your TCU and have them send your images to Dr. Garza's office.     Dr. Garza's care coordinator is Noni Doran. Please contact her at   [phone] to schedule an appointment.       Dr. Garza sees patients at 2 clinic locations:  [facility]  [address] [place], [place] [id number] [place] [address] Orthopedics [facility]   [address] , [address], [place] [address]        Please call the on-call phone number [phone] during evenings, nights   and weekends for any urgent needs. Prescription refills must be done   during business hours by calling [phone].         Follow-up and recommended labs and tests       Follow up with primary care provider, Physician No Ref-Primary, within 7   days for hospital follow- up.  No follow up labs or test are needed.    Follow up with Dr. Carlitos Garza in 1 - 2 week or hospital follow- up.   No follow up labs or test are needed.             Unresulted Labs Ordered in the Past 30 Days of this Admission     Date and Time Order Name Status Description    5/3/2023  2:46 PM Synovial fluid Aerobic Bacterial Culture Routine Preliminary     5/3/2023  2:46 PM Anaerobic Bacterial Culture Routine  Preliminary     5/3/2023  2:44 PM Anaerobic Bacterial Culture Routine Preliminary     5/3/2023  2:35 PM Anaerobic Bacterial Culture Routine Preliminary     5/3/2023  2:32 PM Anaerobic Bacterial Culture Routine Preliminary     5/3/2023  2:26 PM Anaerobic Bacterial Culture Routine Preliminary     5/3/2023  2:18 PM Anaerobic Bacterial Culture Routine Preliminary     5/1/2023 10:40 AM Anaerobic Bacterial Culture Routine Preliminary       These results will be followed up by     Discharge Disposition   Discharged to home  Condition at discharge: Stable      Hospital Course      Left Shoulder  abscess  Patient presenting with a 1 week history of left shoulder pain with swelling and warmth.  Additionally he has a likely sinus tract on the lateral posterior aspect of the shoulder which she reports was draining pus earlier this week.  White count mildly elevated but an elevated CRP.  X-ray of the shoulder could represent chronic degenerative changes versus infection.  Case was discussed with orthopedic surgery in the emergency room who recommended getting a joint aspiration prior to starting antibiotics.  Patient is not septic does not require stat antibiotics at this time.  IR was consulted for arthrocentesis.  -S/P IR athrocentesis on 5/1/23 Follow synovial fluid testing  - ortho consulted and undergoing Inscion and drainage  -antibiotics vancomycin and ceftixone started on 5/1/23 discontiued through 5/6 discharged on Ancef   -Not enough fluid for cell count and crystals   -Pain control as needed    MRI    Peripherally enhancing fluid collection in the left axilla, 4.2 x 4.6 x 6.8 cm, suspicious for an abscess.  2.  Additional thin tubular fluid collection in the posterior subcutaneous tissue of the shoulder could also represent an abscess. This does not appear to communicate with the dominant axillary collection.  3.  No glenohumeral joint effusion.  4.  Severe degenerative changes of the left glenohumeral joint. Chronic  rotator cuff tear and postsurgical changes.  Underwent I and D by orthopedics on 5/3/23     Tissue cultures growing Staph Aurues     Discussed with infectious disease.  Synovial fluid cultures also growing Staph aureus.  Patient also underwent incision and drainage and washout of the of the joint by orthopedics.     Plan  -Awaiting sensitivities of Staph aureus.  Infectious disease Dr. Cuevas has ordered Ancef in the discharge chart.  Will closely follow CYNTHIA and sensitivities if growing MRSA will call infectious disease on-call over the weekend  -DVT prophylaxis aspirin 81 mg as per orthopedics  -Pain management as per orthopedics  -vancomycin stopped 5/6 and cefazolin continued on discharge     # Lower extremity edema  No pulmonary or Cardiac symptoms  -Compression stocking  -Elevation of leg      #Primary prevention of CAD  The patient takes a baby aspirin for primary prevention  Continue aspirn      Consultations This Hospital Stay   INTERVENTIONAL RADIOLOGY ADULT/PEDS IP CONSULT  ORTHOPEDIC SURGERY IP CONSULT  PHARMACY TO DOSE VANCO  VASCULAR ACCESS ADULT IP CONSULT  INFECTIOUS DISEASES IP CONSULT  OCCUPATIONAL THERAPY ADULT IP CONSULT  INFECTIOUS DISEASES IP CONSULT  VASCULAR ACCESS ADULT IP CONSULT  CARE MANAGEMENT / SOCIAL WORK IP CONSULT  PHYSICAL THERAPY ADULT IP CONSULT    Code Status   Full Code    Time Spent on this Encounter   I, Brain Parker MD, personally saw the patient today and spent greater than 30 minutes discharging this patient.       Brain Parker MD  [facility]  [address] [facility] [address]  Phone: [phone]  Fax: [phone]  ______________________________________________________________________  Focused  Physical Exam   Vital Signs: Temp: 98.2  F (36.8  C) Temp src: Oral BP: (!) 150/86 Pulse: 85   Resp: 16 SpO2: 96 % O2 Device: None (Room air)    Weight: 185 lbs 0 oz    Cardiovascular:      Rate and Rhythm: Normal rate and regular rhythm.       Heart sounds: Normal heart sounds.   Pulmonary:      Effort: Pulmonary effort is normal. No respiratory distress.   Abdominal:      Palpations: Abdomen is soft.      Tenderness: There is no abdominal tenderness.   Musculoskeletal:      Right lower leg: No edema.      Left lower leg: No edema.      Comments: Left shoulder swelling with surgical scare no redness or purulence around scar site           Primary Care Physician   Physician No Ref-Primary    Discharge Orders      Home Care Referral      Reason for your hospital stay    S/p left upper extremity I&D (DOS: 5/3/23)     Follow-up and recommended labs and tests     Please call as soon as possible to make an appointment to be seen in Dr. Carlitos Garza's clinic at 1-2 weeks postop for a recheck of your surgical site, possible repeat x-rays, and wound care. If you are at a TCU at this time and they have x-ray capabilities, you may complete your wound care and x-rays at your TCU and have them send your images to Dr. Garza's office.     Dr. Garza's care coordinator is Noni Doran. Please contact her at [phone] to schedule an appointment.       Dr. Garza sees patients at 2 clinic locations:  [facility]  [address] [place], [place] [id number] [place] [address] Orthopedics [facility]   [address], [address], [place] [address]        Please call the on-call phone number [phone] during evenings, nights and weekends for any urgent needs. Prescription refills must be done during business hours by calling [phone].     Activity    Your activity upon discharge: Left upper extremity WBAT, ROM as tolerated.     Wound care and dressings    Instructions to care for your wound at home: Please leave dressing intact for 2 weeks postoperatively. Okay to change if saturated >50% or peeling. Okay to shower with dressing securely covered. No soaking or submersion. Please contact your orthopedic surgical team if persistent drainage or  redness develops around the surgical site.     Discharge Instructions    Pain after surgery is normal and expected.  You will have some amount of pain for several weeks after surgery.  Your pain will improve with time.  There are several things you can do to help reduce your pain including: rest, ice, elevation, and using pain medications as needed. Contact your Surgeon Team if you have pain that persists or worsens after surgery despite rest, ice, elevation, and taking your medication(s) as prescribed. Contact your Surgeon Team if you have new numbness, tingling, or weakness in your operative extremity.    Swelling and/or bruising of the surgical extremity is common and may persist for several months after surgery. In addition to frequent icing and elevation, gentle compressive support with an ACE wrap or tubigrip may help with swelling. Apply compression regularly, removing at least twice daily to perform skin checks. Contact your Surgeon Team if your swelling increases and is NOT associated with an increase in your activity level, or if your swelling increases and is associated with redness and pain.    Ice can be used to control swelling and discomfort after surgery. Place a thin towel over your operative site and apply the ice pack overtop. Leave ice pack in place for 20 minutes, then remove for 20 minutes. Repeat this 20 minutes on/20 minutes off routine as often as tolerated.    Please contact your surgical team with any concerns for infection including increasing redness around your surgical site, pus-like drainage, fever, chills, or flu-like symptoms.     Reason for your hospital stay    left Shoulder  abscess     Follow-up and recommended labs and tests     Follow up with primary care provider, Physician No Ref-Primary, within 7 days for hospital follow- up.  No follow up labs or test are needed.    Follow up with Dr. Carlitos Garza in 1 - 2 week or hospital follow- up. No follow up labs or test are  needed.     Activity    Your activity upon discharge: activity as tolerated     Diet    Follow this diet upon discharge: Orders Placed This Encounter      Advance Diet as Tolerated: Regular Diet Adult       Significant Results and Procedures   Results for orders placed or performed during the hospital encounter of 04/30/23   XR Shoulder Left G/E 3 Views    Narrative    EXAM: XR SHOULDER LEFT G/E 3 VIEWS  LOCATION: [facility]  DATE/TIME: 4/30/2023 6:48 PM CDT    INDICATION: Pain. Chronic nonhealing wound.  COMPARISON: None.      Impression    IMPRESSION:     Moderate to severe glenohumeral joint space narrowing with areas of sclerosis in the humeral head with adjacent areas of lucency, which could reflect sequelae of chronic degenerative changes, however the acuity cannot be determined and underlying   infection cannot be excluded. Significant narrowing of the acromiohumeral interval suggesting underlying rotator cuff tear.      NOTE: ABNORMAL REPORT    THE DICTATION ABOVE DESCRIBES AN ABNORMALITY FOR WHICH FOLLOW-UP IS NEEDED.     XR Joint Aspiration Major Left    Narrative    EXAM: XR JOINT ASPIRATION MAJOR LEFT  5/1/2023 1:31 PM       History:  Left shoulder pain. History of ulceration on the lateral  aspect of the left shoulder, superficial to the deltoid. Bloody and  purulent drainage in the past. Request for left shoulder aspiration to  evaluate for septic joint. History of dislocation as well as rotator  cuff surgical repair.     PROCEDURE: The risks (including bleeding, infection, and allergy to  contrast and medications) and benefits of the procedure were explained  to the patient and consent was obtained.  Using sterile technique and  fluoroscopic guidance, a #20 gauge needle was placed into the left  shoulder joint using an anterior approach.  Unable to aspirate fluid  from the joint space after multiple positions. 10 mL of  preservative-free saline was then infused for  irrigation. 1 to 2 mL of  the fluid was then aspirated for cultures. Contrast was then injected  into the joint space to see if there is a communication to the lateral  ulceration. No defined communication from the joint space to the  lateral fluid collection seen. Estimated blood loss during the  procedure was less than 5 mL. No initial complication. Fluid sent to  lab for analysis.      Fluoroscopy time: 0.5 minutes  Images Obtained: 4  Medications used: 5 mL 1% Lidocaine, 8 mL Isovue M 200, 10 mL 0.9%  NaCl PF      Impression    IMPRESSION:  Technically successful left shoulder aspiration after  irrigation with preservative-free saline. No demonstrated  communication between the joint space and the lateral shoulder  ulceration.    MARIA EUGENIA MCDONALD PA-C         SYSTEM ID:  [id number]   US Joint Injection Aspiration Major Left    Narrative    US JOINT INJECTION ASPIRATION MAJOR LEFT              5/1/2023 12:52  PM       History:  Left shoulder pain. X-ray guided intra-articular left  shoulder aspiration done under x-ray guidance. No communication to the  lateral shoulder ulcerations/fluid collection ddemonstrated with the  arthrogram. Patient reports purulent material and blood periodically  oozing from the site. Ultrasound images show a possible dense fluid  collection.    PROCEDURE: The risks (including bleeding, infection, and allergy to  contrast and medications) and benefits of the procedure were explained  to the patient and consent was obtained.  Using sterile technique and  ultrasound guidance, a 22-gauge needle was then placed into the  possible fluid collection with aspiration. No initial fluid returned.  Area was irrigated and a sample of 2 to 3 mL was collected from the  space. Estimated blood loss was less than 15 mL. No initial  complication.        Impression    IMPRESSION:  Technically successful irrigation and aspiration from the  left lateral shoulder potential fluid collection. Sample sent  for  cultures.    MARIA EUGENIA MCDONALD PA-C         SYSTEM ID:  [id number]   MR Shoulder Left w/o & w Contrast    Narrative    EXAM: MR SHOULDER LEFT W/O CONTRAST  LOCATION: [facility]  DATE/TIME: 5/2/2023 6:50 PM CDT    INDICATION: Left inferior shoulder fluid collection eval for abscess; Shoulder pain; Infection;   COMPARISON: Radiographs 04/30/2023  TECHNIQUE: Routine. Additional postgadolinium T1 sequences were obtained.  IV CONTRAST: 8 mL Gadavist    FINDINGS:    Suboptimal evaluation due to artifact.     ROTATOR CUFF:  Severe atrophy of the subscapularis muscle. Subscapularis tendon is likely chronically torn and retracted. Evidence of prior rotator cuff repair. At least partial tearing of the supraspinatus tendon. Tendinosis of the infraspinatus tendon. Infraspinatus   and teres minor tendons are intact.    ACROMIOCLAVICULAR JOINT:   Mild degenerative changes.    LONG HEAD OF BICEPS TENDON:   Tendon is not visualized in the bicipital groove superiorly and likely chronically subluxed. Correlate with surgical history.    GLENOHUMERAL JOINT:   No joint effusion or synovitis. Severe glenohumeral joint degenerative changes. Superior migration of the humeral head.    BONES:   No evidence for osteomyelitis. No fracture or concerning marrow replacing lesion.    SOFT TISSUES:   There is a peripherally enhancing fluid collection in the left axilla measuring 4.2 x 4.6 x 6.8 cm, with peripheral septations. Edema and enhancement throughout the soft tissue of the left axilla.  Mild adjacent edema/enhancement in adjacent musculature   including the pectoralis minor and deltoid. There is a separate thin tubular fluid collection extending from the posterolateral aspect of the shoulder over the posterior deltoid. This measures approximately 1.8 x 0.9 x 5.5 cm. This does not appear to   communicate with the axillary collection.      Impression    IMPRESSION:  1.  Peripherally enhancing fluid collection in the  left axilla, 4.2 x 4.6 x 6.8 cm, suspicious for an abscess.  2.  Additional thin tubular fluid collection in the posterior subcutaneous tissue of the shoulder could also represent an abscess. This does not appear to communicate with the dominant axillary collection.  3.  No glenohumeral joint effusion.  4.  Severe degenerative changes of the left glenohumeral joint. Chronic rotator cuff tear and postsurgical changes.       Discharge Medications   Current Discharge Medication List      START taking these medications    Details   ceFAZolin (ANCEF) 1 GM vial Inject 2 g into the vein every 8 hours for 21 days CBC with differential, creatinine, SGOT weekly while on this medication to be faxed to Dr. Gonzalez office.  Qty: 1 each    Associated Diagnoses: Pyogenic arthritis of left shoulder region, due to unspecified organism (H)      oxyCODONE (ROXICODONE) 5 MG tablet Take 1 tablet (5 mg) by mouth every 4 hours as needed for moderate pain  Qty: 8 tablet, Refills: 0    Associated Diagnoses: Pyogenic arthritis of left shoulder region, due to unspecified organism (H)      !! polyethylene glycol (MIRALAX) 17 GM/Dose powder Take 17 g by mouth daily for 30 days  Qty: 510 g, Refills: 0    Associated Diagnoses: Pyogenic arthritis of left shoulder region, due to unspecified organism (H)      !! polyethylene glycol (MIRALAX) 17 GM/Dose powder Take 17 g by mouth daily  Qty: 510 g, Refills: 0    Associated Diagnoses: Pyogenic arthritis of left shoulder region, due to unspecified organism (H)      saccharomyces boulardii (FLORASTOR) 250 MG capsule Take 1 capsule (250 mg) by mouth 2 times daily for 30 days  Qty: 60 capsule, Refills: 0    Associated Diagnoses: Pyogenic arthritis of left shoulder region, due to unspecified organism (H)       !! - Potential duplicate medications found. Please discuss with provider.      CONTINUE these medications which have CHANGED    Details   acetaminophen (TYLENOL) 325 MG tablet Take 3 tablets (975 mg)  by mouth every 8 hours as needed for mild pain  Qty: 100 tablet, Refills: 0    Associated Diagnoses: Pyogenic arthritis of left shoulder region, due to unspecified organism (H)      aspirin (ASA) 81 MG EC tablet Take 1 tablet (81 mg) by mouth 2 times daily for 42 days  Qty: 84 tablet, Refills: 0    Associated Diagnoses: Pyogenic arthritis of left shoulder region, due to unspecified organism (H)         CONTINUE these medications which have NOT CHANGED    Details   ibuprofen (ADVIL/MOTRIN) 200 MG tablet Take 400 mg by mouth every 6 hours as needed for pain           Allergies   No Known Allergies

## 2023-05-06 NOTE — PLAN OF CARE
Goal Outcome Evaluation:       A&OX4. VSS on room air. Up IND. Midline placed, SL in between antibiotics. Ambulating in the hallways ind. Pain 2/10 today. Dressing to L shoulder CDI. Pt wanted to discharge today but was unable to have home infusion services begein until tomorrow evening. Discussed with pt and was refusing and threatening to leave AMA for most of shift. Became agreeable to stay another night at the end of shift. Currently went outside for some fresh air, writer informed him that he is to be back before 2hr, he informed writer that he only wants to go out for 30min. Pt gave writer his number in case we need to contact him also available in the chart. Incoming shift and charge aware.

## 2023-05-06 NOTE — PROGRESS NOTES
[facility]    PROGRESS NOTE - Hospitalist Service    Assessment and Plan    Principal Problem:    Pyogenic arthritis of left shoulder region, due to unspecified organism (H)    Left Shoulder abscess  Patient presenting with a 1 week history of left shoulder pain with swelling and warmth.  Additionally he has a likely sinus tract on the lateral posterior aspect of the shoulder which she reports was draining pus earlier this week.  White count mildly elevated but an elevated CRP.  X-ray of the shoulder could represent chronic degenerative changes versus infection.  Case was discussed with orthopedic surgery in the emergency room who recommended getting a joint aspiration prior to starting antibiotics.  Patient is not septic does not require stat antibiotics at this time.  IR was consulted for arthrocentesis.  -S/P IR athrocentesis on 5/1/23 Follow synovial fluid testing  - ortho consulted and undergoing Inscion and drainage  -antibiotics vancomycin and ceftixone started on 5/1/23 discontiued through 5/6 discharged on Ancef   -Not enough fluid for cell count and crystals   -Pain control as needed     MRI    Peripherally enhancing fluid collection in the left axilla, 4.2 x 4.6 x 6.8 cm, suspicious for an abscess.  2.  Additional thin tubular fluid collection in the posterior subcutaneous tissue of the shoulder could also represent an abscess. This does not appear to communicate with the dominant axillary collection.  3.  No glenohumeral joint effusion.  4.  Severe degenerative changes of the left glenohumeral joint. Chronic rotator cuff tear and postsurgical changes.  Underwent I and D by orthopedics on 5/3/23     Tissue cultures growing Staph Aurues     Discussed with infectious disease.  Synovial fluid cultures also growing Staph aureus.  Patient also underwent incision and drainage and washout of the of the joint by orthopedics.     Plan  -Awaiting sensitivities of Staph aureus.  Infectious  disease Dr. Cuevas has ordered Ancef in the discharge chart.  Will closely follow CYNTHIA and sensitivities if growing MRSA will call infectious disease on-call over the weekend  -DVT prophylaxis aspirin 81 mg as per orthopedics  -Pain management as per orthopedics  -vancomycin stopped 5/6 and cefazolin continued on discharge     # Lower extremity edema  No pulmonary or Cardiac symptoms  -Compression stocking  -Elevation of leg      #Primary prevention of CAD  The patient takes a baby aspirin for primary prevention  Continue aspirin    COVID-19 PCR Results         No data to display            COVID-19 Antibody Results, Testing for Immunity         No data to display               VTE prophylaxis:  Pneumatic Compression Devices  DIET: Orders Placed This Encounter      Advance Diet as Tolerated: Regular Diet Adult      Diet     Drains/Lines: midline  Weight bearing status:wBAT  Disposition/Barriers to discharge: home   Code Status: Full Code    Subjective:  Patient seen examined at bedside/chart reviewed.  He is feeling well.  Patient wants to go home today.  Discharge orders were placed earlier however patient is unable to have his home infusion company [facility] get out this evening.    PHYSICAL EXAM  Temp:  [98  F (36.7  C)-98.4  F (36.9  C)] 98.2  F (36.8  C)  Pulse:  [82-87] 85  Resp:  [16-18] 16  BP: (136-150)/(81-89) 150/86  SpO2:  [92 %-98 %] 96 %  Wt Readings from Last 1 Encounters:   04/30/23 83.9 kg (185 lb)     Cardiovascular:      Rate and Rhythm: Normal rate and regular rhythm.      Heart sounds: Normal heart sounds.   Pulmonary:      Effort: Pulmonary effort is normal. No respiratory distress.   Abdominal:      Palpations: Abdomen is soft.      Tenderness: There is no abdominal tenderness.   Musculoskeletal:      Right lower leg: No edema.      Left lower leg: No edema.      Comments: Left shoulder swelling with surgical scare no redness or purulence around scar site      Intake/Output Summary (Last 24 hours)  at 5/6/2023 1438  Last data filed at 5/6/2023 0809  Gross per 24 hour   Intake 240 ml   Output --   Net 240 ml      Body mass index is 24.41 kg/m .  PERTINENT LABS/IMAGING:  Results for orders placed or performed during the hospital encounter of 04/30/23   XR Shoulder Left G/E 3 Views    Impression    IMPRESSION:     Moderate to severe glenohumeral joint space narrowing with areas of sclerosis in the humeral head with adjacent areas of lucency, which could reflect sequelae of chronic degenerative changes, however the acuity cannot be determined and underlying   infection cannot be excluded. Significant narrowing of the acromiohumeral interval suggesting underlying rotator cuff tear.      NOTE: ABNORMAL REPORT    THE DICTATION ABOVE DESCRIBES AN ABNORMALITY FOR WHICH FOLLOW-UP IS NEEDED.     XR Joint Aspiration Major Left    Impression    IMPRESSION:  Technically successful left shoulder aspiration after  irrigation with preservative-free saline. No demonstrated  communication between the joint space and the lateral shoulder  ulceration.    MARIA EUGENIA MCDONALD PA-C         SYSTEM ID:  [id number]   US Joint Injection Aspiration Major Left    Impression    IMPRESSION:  Technically successful irrigation and aspiration from the  left lateral shoulder potential fluid collection. Sample sent for  cultures.    MARIA EUGENIA MCDONALD PA-C         SYSTEM ID:  [id number]   MR Shoulder Left w/o & w Contrast    Impression    IMPRESSION:  1.  Peripherally enhancing fluid collection in the left axilla, 4.2 x 4.6 x 6.8 cm, suspicious for an abscess.  2.  Additional thin tubular fluid collection in the posterior subcutaneous tissue of the shoulder could also represent an abscess. This does not appear to communicate with the dominant axillary collection.  3.  No glenohumeral joint effusion.  4.  Severe degenerative changes of the left glenohumeral joint. Chronic rotator cuff tear and postsurgical changes.       NOTE: Data reviewed over the past 24  hrs contributes toward MDM complexity  No results for input(s): CHOL, HDL, LDL, TRIG, CHOLHDLRATIO in the last 13058 hours.  No results for input(s): LDL in the last 42410 hours.  Recent Labs   Lab Test 05/06/23  0649 05/05/23  0721 05/05/23  0603 05/02/23  0641 05/01/23  0740   NA  --   --   --   --  140   POTASSIUM  --   --   --   --  3.8   CHLORIDE  --   --   --   --  101   CO2  --   --   --   --  23   GLC  --   --  101*   < > 95   BUN  --   --   --   --  10.1   CR 0.80   < >  --    < > 0.81   GFRESTIMATED >90   < >  --    < > >90   RUPA  --   --   --   --  9.0    < > = values in this interval not displayed.     No results for input(s): A1C in the last 36936 hours.  Recent Labs   Lab Test 05/05/23  0721 05/04/23  0642 05/01/23  0740   HGB 11.5* 12.2*  12.2* 14.4     No results for input(s): TROPONINI in the last 49256 hours.  No results for input(s): BNP, NTBNPI, NTBNP in the last 24085 hours.  No results for input(s): TSH in the last 41917 hours.  No results for input(s): INR in the last 76976 hours.    Brain Parker MD  [facility] Medicine Service  Cell phone #[phone]

## 2023-05-06 NOTE — PROCEDURES
[facility]    Single Lumen Midline Placement    Date/Time: 5/6/2023 10:43 AM    Performed by: Sonia Urbina RN  Authorized by: Brain Parker MD  Indications: vascular access      UNIVERSAL PROTOCOL   Site Marked: Yes  Prior Images Obtained and Reviewed:  Yes  Required items: Required blood products, implants, devices and special equipment available    Patient identity confirmed:  Arm band and hospital-assigned identification number  NA - No sedation, light sedation, or local anesthesia  Confirmation Checklist:  Patient's identity using two indicators, relevant allergies, procedure was appropriate and matched the consent or emergent situation and correct equipment/implants were available  Time out: Immediately prior to the procedure a time out was called    Universal Protocol: the Joint Commission Universal Protocol was followed    Preparation: Patient was prepped and draped in usual sterile fashion       ANESTHESIA    Anesthesia: Local infiltration  Local Anesthetic:  Lidocaine 1% without epinephrine  Anesthetic Total (mL):  1      SEDATION    Patient Sedated: No        Preparation: skin prepped with 2% chlorhexidine and skin prepped with ChloraPrep  Skin prep agent: skin prep agent completely dried prior to procedure  Sterile barriers: maximum sterile barriers were used: cap, mask, sterile gown, sterile gloves, and large sterile sheet  Hand hygiene: hand hygiene performed prior to central venous catheter insertion  Type of line used: Midline and Power PICC  Catheter type: single lumen  Lumen type: non-valved  Catheter size: 4 Fr  Brand: Bard  Lot number: [id number]  Placement method: MST and ultrasound  Number of attempts: 1  Difficulty threading catheter: no  Successful placement: yes  Orientation: right    Location: basilic vein  Arm circumference: adults 10 cm  Extremity circumference: 33  Visible catheter length: 0  Internal length: 20 cm  Total catheter length: 20  Dressing and  securement: blood removed, chlorhexidine patch applied, occlusive dressing applied and statlock  Post procedure assessment: blood return through all ports  PROCEDURE   Patient Tolerance:  Patient tolerated the procedure well with no immediate complicationsDescribe Procedure: Right upper arm midline placed for 3 weeks IV Ancef.  Line ready for immediate use

## 2023-05-06 NOTE — PROGRESS NOTES
Orthopedic Surgery  Simone Mercedes  05/06/2023     Admit Date:  4/30/2023  POD: 3 Days Post-Op   Procedure(s):  LEFT SHOULDER ABSCESS IRRIGATION AND DEBRIDEMENT, LEFT SHOULDER CHRONIC WOUND IRRIGATION AND DEBRIDEMENT     Patient resting comfortably in chair. Minimal drainage.   Pain improved.   Tolerating oral intake.    Denies nausea or vomiting  Denies chest pain or shortness of breath.     Temp:  [98  F (36.7  C)-98.4  F (36.9  C)] 98.2  F (36.8  C)  Pulse:  [82-87] 85  Resp:  [16-18] 16  BP: (136-150)/(81-89) 150/86  SpO2:  [92 %-98 %] 96 %    Alert and oriented.  Able to flex and extend fingers without difficulty  Full sensation to light touch  Radial pulse present  Capillary refill <2 seconds    Labs:  Recent Labs   Lab Test 05/05/23  0721 05/04/23  0642 05/01/23  0740 04/30/23  1647   WBC  --  13.0* 10.2 11.9*   HGB 11.5* 12.2*  12.2* 14.4 14.6   PLT  --  290 270 301     No lab results found.  Recent Labs   Lab Test 05/02/23  0641 04/30/23  1647   CRPI 140.07* 127.71*     1. Plan:   ASA 81mg daily for DVT prophylaxis.   Abx per ID. PO Doxycline added 100 mg BID for 28 days per ID. This is in addition to the IV antibiotics.    Mobilize with PT/OT    WBAT Left UE      Continue current pain regimen   Follow-up: 2 weeks post-op with 2 weeks team    2. Disposition   Anticipate d/c to home. Patient has PICC line, needs home infusion for discharge. Care management actively working on it. Patient states he is leaving by 5 pm by choice. Closed to conversation.     Ayana Shields PA-C

## 2023-05-06 NOTE — PLAN OF CARE
Patient vital signs are at baseline: Yes  Patient able to ambulate as they were prior to admission or with assist devices provided by therapies during their stay:  Yes  Patient MUST void prior to discharge:  Yes  Patient able to tolerate oral intake:  Yes  Pain has adequate pain control using Oral analgesics:  Yes  Does patient have an identified :  Yes  Has goal D/C date and time been discussed with patient:  Yes     Ind in room, discharge possibly today to home

## 2023-05-06 NOTE — PROGRESS NOTES
Simone Mercedes  5/6/2023    Phone call / test follow up / other actions    Data:      Assessment:  MSSA infection    Plan  Cont cefazolin  Stop vanco       Link Sanchez MD  Covering for Paulino Mojica & Lisa  [facility] Infectious Diseases  [phone]

## 2023-05-07 VITALS
SYSTOLIC BLOOD PRESSURE: 188 MMHG | HEIGHT: 73 IN | WEIGHT: 185 LBS | BODY MASS INDEX: 24.52 KG/M2 | TEMPERATURE: 98 F | DIASTOLIC BLOOD PRESSURE: 104 MMHG | RESPIRATION RATE: 18 BRPM | HEART RATE: 83 BPM | OXYGEN SATURATION: 98 %

## 2023-05-07 PROBLEM — A49.8 PROPIONIBACTERIUM INFECTION: Status: ACTIVE | Noted: 2023-05-07

## 2023-05-07 PROBLEM — A49.01 METHICILLIN SUSCEPTIBLE STAPHYLOCOCCUS AUREUS INFECTION: Status: ACTIVE | Noted: 2023-05-07

## 2023-05-07 LAB
CREAT SERPL-MCNC: 0.82 MG/DL (ref 0.67–1.17)
GFR SERPL CREATININE-BSD FRML MDRD: >90 ML/MIN/1.73M2

## 2023-05-07 PROCEDURE — 250N000013 HC RX MED GY IP 250 OP 250 PS 637

## 2023-05-07 PROCEDURE — 250N000013 HC RX MED GY IP 250 OP 250 PS 637: Performed by: STUDENT IN AN ORGANIZED HEALTH CARE EDUCATION/TRAINING PROGRAM

## 2023-05-07 PROCEDURE — 250N000011 HC RX IP 250 OP 636: Performed by: INTERNAL MEDICINE

## 2023-05-07 PROCEDURE — 250N000013 HC RX MED GY IP 250 OP 250 PS 637: Performed by: HOSPITALIST

## 2023-05-07 PROCEDURE — 82565 ASSAY OF CREATININE: CPT | Performed by: HOSPITALIST

## 2023-05-07 PROCEDURE — 36415 COLL VENOUS BLD VENIPUNCTURE: CPT | Performed by: HOSPITALIST

## 2023-05-07 PROCEDURE — 99239 HOSP IP/OBS DSCHRG MGMT >30: CPT | Performed by: HOSPITALIST

## 2023-05-07 RX ADMIN — CEFAZOLIN SODIUM 2 G: 2 INJECTION, SOLUTION INTRAVENOUS at 12:00

## 2023-05-07 RX ADMIN — Medication 250 MG: at 09:42

## 2023-05-07 RX ADMIN — POLYETHYLENE GLYCOL 3350 17 G: 17 POWDER, FOR SOLUTION ORAL at 09:42

## 2023-05-07 RX ADMIN — OXYCODONE HYDROCHLORIDE 10 MG: 5 TABLET ORAL at 09:42

## 2023-05-07 RX ADMIN — SENNOSIDES AND DOCUSATE SODIUM 1 TABLET: 50; 8.6 TABLET ORAL at 09:42

## 2023-05-07 RX ADMIN — ACETAMINOPHEN 650 MG: 325 TABLET ORAL at 04:18

## 2023-05-07 RX ADMIN — ASPIRIN 81 MG: 81 TABLET, COATED ORAL at 09:42

## 2023-05-07 RX ADMIN — CEFAZOLIN SODIUM 2 G: 2 INJECTION, SOLUTION INTRAVENOUS at 04:05

## 2023-05-07 ASSESSMENT — ACTIVITIES OF DAILY LIVING (ADL)
ADLS_ACUITY_SCORE: 18

## 2023-05-07 NOTE — PROGRESS NOTES
"Note Infectious Disease Consult Service Progress Note  Covering for Mason Wilson & Espinoza   Pt Name Simone Mercedes   Date 05/07/2023   MRN [id number]     Notes / labs / imaging test results and other data were reviewed    CHIEF COMPLAINT: REASON FOR VISIT    L shoulder    HPI    62 yo with  Polymicrobial infection L shoulder  5/3 to OR for I & D chronic abscess  Cultures MSSA and C acnes    5/7/2023  INTERIM UPDATE    Doing well  Anxious to go home    Remainder of 14-point ROS was negative except as listed above    PFSH:  Personal / Family / Social Histories were reviewed and updated  nil new  Vital Signs: BP (!) 188/104 (BP Location: Right arm)   Pulse 83   Temp 98  F (36.7  C) (Oral)   Resp 18   Ht 1.854 m (6' 1\")   Wt 83.9 kg (185 lb)   SpO2 98%   BMI 24.41 kg/m    EXAM    genl   Sitting in chair  no acute distress     lung   Respiring easily on room air  No cough     skin   tanned     L shoulder   Incision intact  Area  Redness of skin and some peeling proximal  Anterior  Axillary line     IV   R arm     neuro   Normal conversation  Smiling         Data  Microbiology    5/3 Shoulder  MSSA  +  C acnes      LABS  Lab Results   Component Value Date/Time    WBC 13.0 (H) 05/04/2023 06:42 AM    WBC 10.2 05/01/2023 07:40 AM    WBC 11.9 (H) 04/30/2023 04:47 PM    AST 16 05/01/2023 07:40 AM    ALT 16 05/01/2023 07:40 AM    ALKPHOS 49 05/01/2023 07:40 AM           ASSESSMENT & SUGGESTIONS  Patient Active Problem List   Diagnosis Code     Pyogenic arthritis L shoulder region,(H) M00.9     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             Nicola Sanchez MD  Covering for Paulino Gonzalez & Mason & Ali  Infectious Disease service    CURRENT MED REVIEWD  Current Facility-Administered Medications   Medication     acetaminophen (TYLENOL) tablet 650 mg    Or     acetaminophen (TYLENOL) Suppository 650 mg     aspirin EC tablet 81 mg     benzocaine-menthol (CHLORASEPTIC) 6-10 MG lozenge 1 lozenge     bisacodyl (DULCOLAX) suppository 10 mg     ceFAZolin (ANCEF) 2 g in 100 mL D5W intermittent infusion     HYDROmorphone (DILAUDID) injection 0.2 mg    Or     HYDROmorphone (DILAUDID) injection 0.4 mg     lactated ringers infusion     lidocaine (LMX4) cream     lidocaine (LMX4) cream     lidocaine 1 % 0.1-1 mL     lidocaine 1 % 0.1-1 mL     lidocaine 1 % 0.1-1 mL     magnesium hydroxide (MILK OF MAGNESIA) suspension 30 mL     melatonin tablet 3 mg     naloxone (NARCAN) injection 0.2 mg    Or     naloxone (NARCAN) injection 0.4 mg    Or     naloxone (NARCAN) injection 0.2 mg    Or     naloxone (NARCAN) injection 0.4 mg     ondansetron (ZOFRAN ODT) ODT tab 4 mg    Or     ondansetron (ZOFRAN) injection 4 mg     oxyCODONE (ROXICODONE) tablet 5 mg    Or     oxyCODONE (ROXICODONE) tablet 10 mg     polyethylene glycol (MIRALAX) Packet 17 g     prochlorperazine (COMPAZINE) injection 10 mg    Or     prochlorperazine (COMPAZINE) tablet 10 mg     saccharomyces boulardii (FLORASTOR) capsule 250 mg     senna-docusate (SENOKOT-S/PERICOLACE) 8.6-50 MG per tablet 1 tablet     senna-docusate (SENOKOT-S/PERICOLACE) 8.6-50 MG per tablet 1 tablet    Or     senna-docusate (SENOKOT-S/PERICOLACE) 8.6-50 MG per tablet 2 tablet     sodium chloride (PF) 0.9% PF flush 10 mL     sodium chloride (PF) 0.9% PF flush 10-20 mL     sodium chloride (PF) 0.9% PF flush 3 mL     sodium chloride (PF) 0.9% PF flush 3 mL     sodium chloride (PF) 0.9% PF flush 3 mL     sodium  chloride (PF) 0.9% PF flush 3 mL

## 2023-05-07 NOTE — PLAN OF CARE
Goal Outcome Evaluation:  Shift Summary: admitted 4/30 for septic arthritis of left shoulder. 5/1 I&D. 5/7 midline for home abx  Orientation: A/Ox4   Activity Level: ind  Fall Risk: no  Behavior & Aggression Tool Color: green Pain Management: 5/10 left shoulder, headache- Tylenol with effect  ABNL VS/O2: VSS on RA- HTN  ABNL Lab/BG: n/a  Diet: reg  Bowel/Bladder: continent   Drains/Devices: R midline saline locked   Tests/Procedures for next shift: n/a  Anticipated DC date: 5/7/23  Other Important Info: Reported that pt was upset about not being able tot discharge 5/6. He has 2 more doses of antibiotics and the home IV service will not be available until this evening.     5/6/23 [id number]

## 2023-05-07 NOTE — PROGRESS NOTES
"Care Management Follow Up    Length of Stay (days): 7    Expected Discharge Date: 05/07/2023     Concerns to be Addressed:       Patient plan of care discussed at interdisciplinary rounds: Yes    Anticipated Discharge Disposition: Home Infusion     Anticipated Discharge Services:    Anticipated Discharge DME:      Patient/family educated on Medicare website which has current facility and service quality ratings: no  Education Provided on the Discharge Plan:    Patient/Family in Agreement with the Plan: yes    Referrals Placed by CM/SW: Home Infusion  Private pay costs discussed: Pat agrees to responsibility for [facility] infusion cost not paid by his insurance company.  Benefit check to be completed on Monday 5/8    Additional Information:  Writer placed call to [facility] to arrange for teaching and delivery of Ancef today.  Await call back from [facility].  0955: VM was left by [facility] requesting orders & H & P, Face Sheet, midline documentation and notes be axed to [phone]  Writer received this msg and requested  To sign the orders with todays date.    Documentation was faxed to [facility] at 1100. Writer then called [facility] back and  She is not sure they can fill this order today.  She will call writer back within the hour to let me know if she received the fax and if they can move forward today.    1429: Writer spoke with Rex Nunez at [facility], they are still waiting for their intake to \"process\" the faxes sent earlier and not able to start today.  Writer asked they plan to confirm with writer or other CC tomorrow and plan to do a start of care tomorrow as they are the only agency in network for Michael.  Mayra will update writer if/ when patient is added to their system today.    [facility] contact info added to the AVS.        Patricia Worley RN      "

## 2023-05-07 NOTE — PLAN OF CARE
Goal Outcome Evaluation:       A&Ox4. VSS on room air ex bp which was elevated due to anxiety, at the start of shift which improved as the day went on. Went outside several times today to help with anxiety which was effective for him. Was calm throughout most of morning. Discussed with CM regarding discharge home infusion services, this was not able to be set up today, CM sent patient info yesterday to [facility] but it seems that it wasn't processed yesterday. They were once again sent today and [facility] are going to process today but wont be able to start home infusion services today due to this. See CM note for more info. Informed pt of this and that it would be better for him to stay until home infusion services are initiated, but he insisted on discharging today and right away. Did explain to pt that he would miss some doses of his antibiotic and if was to stay until 7pm he could get his next dose, Pt stated to writer that he wasn't concerned in regards to this and would like to go home right away. Explained to pt to call [facility] tomorrow morning to speak to them in regards to his services, and to call the unit if he has any problems or difficulty, numbers were put on AVS, CM also to follow. Was able to get his doxycycline order filled at the hospital and gave it to him before discharge. Discharge today to home at 1515, ride provided by friend. Discharge instructions, and medications given. Instruction in regards to care for his midline also given. All questions answered pt verbalized understanding. Pt reported having all belongings.

## 2023-05-07 NOTE — PLAN OF CARE
Summary: 9831-3199 5/6/23 septic arthritis of left shoulder  Orientation: A/Ox4   Activity Level: ind  Fall Risk: no  Behavior & Aggression Tool Color: green a bit agitated at the beginning of shift  Pain Management: denies  ABNL VS/O2: VSS on RA  ABNL Lab/BG: n/a  Diet: reg  Bowel/Bladder: continent   Drains/Devices: R midline saline locked   Tests/Procedures for next shift: n/a  Anticipated DC date: 5/7/23  Other Important Info: pt was upset about not being able tot discharge today. They have 3 more doses of antibiotics and the at home IV service will not be available till tomorrow evening. Day shift talked to pt and they decided to stay and receive the rest of their IV antibiotics.

## 2023-05-07 NOTE — PROGRESS NOTES
Orthopedic Surgery  Simone Mercedes  05/07/2023     Admit Date:  4/30/2023  POD: 4 Days Post-Op   Procedure(s):  LEFT SHOULDER ABSCESS IRRIGATION AND DEBRIDEMENT, LEFT SHOULDER CHRONIC WOUND IRRIGATION AND DEBRIDEMENT     Patient resting comfortably in chair. Minimal drainage. Anxious for discharge, waiting on coordination of home infusion.   Pain controlled.   Tolerating oral intake.    Denies nausea or vomiting  Denies chest pain or shortness of breath.   Denies numbness or tingling    Temp:  [97.7  F (36.5  C)-98  F (36.7  C)] 98  F (36.7  C)  Pulse:  [83] 83  Resp:  [18] 18  BP: (153-191)/() 188/104  SpO2:  [98 %] 98 %    Alert and oriented.  Able to flex and extend fingers without difficulty  Full sensation to light touch  Radial pulse present  Capillary refill <2 seconds    Labs:  Recent Labs   Lab Test 05/05/23  0721 05/04/23  0642 05/01/23  0740 04/30/23  1647   WBC  --  13.0* 10.2 11.9*   HGB 11.5* 12.2*  12.2* 14.4 14.6   PLT  --  290 270 301     No lab results found.  Recent Labs   Lab Test 05/02/23  0641 04/30/23  1647   CRPI 140.07* 127.71*     1. Plan:   ASA 81mg daily for DVT prophylaxis.   Abx per ID. PO Doxycline added 100 mg BID for 28 days per ID. This is in addition to the IV antibiotics.  Patient in understanding of this plan.   Mobilize with PT/OT    WBAT LUE   Continue current pain regimen   Follow-up: 2 weeks post-op with 2 weeks team    2. Disposition   Anticipate d/c to home. Patient has PICC line, needs home infusion for discharge. Care management actively working on it. Patient feels relieved after 2 hour pass outside to walk around. Waiting patiently for home infusion coordination.     Ayana Shields PA-C

## 2023-05-08 LAB — BACTERIA SNV CULT: NO GROWTH

## 2023-05-08 NOTE — PROGRESS NOTES
Care Management Follow Up    Length of Stay (days): 7    Expected Discharge Date: 05/07/2023     Concerns to be Addressed:       Patient plan of care discussed at interdisciplinary rounds: Yes    Anticipated Discharge Disposition: Home Infusion     Anticipated Discharge Services:    Anticipated Discharge DME:      Patient/family educated on Medicare website which has current facility and service quality ratings: no  Education Provided on the Discharge Plan:    Patient/Family in Agreement with the Plan: yes    Referrals Placed by CM/SW: Home Infusion  Private pay costs discussed: insurance costs out of pocket expenses    Additional Information:  Writer received call from [facility] that pt discharged to home 5/7 without getting benefits checked and missing 2 or 3 doses of his IV antibiotics. Pt was anxious to discharge from hospital on 5/6 but stayed until 5/7 to get home IV situation straighened out. Writer discussed with Dr. Cuevas about situation. She stated that plan will still be ok with the dosing he got here in the hospital and his continuation of home IV infusion at home. Writer contacted and left msg with [facility]. Writer will continue to update with any new information.    Writer received call back from Jodie at [facility] that home RN will be out to his house by 1500 today to admister IV antibx ordered. Benefits were verified and pt was advised of out of pocket costs and was in agreement to proceed.         Aly Katz RN

## 2023-05-10 LAB
BACTERIA ABSC ANAEROBE+AEROBE CULT: NORMAL
BACTERIA TISS BX CULT: ABNORMAL
BACTERIA TISS BX CULT: ABNORMAL
BACTERIA TISS BX CULT: NORMAL
BACTERIA TISS BX CULT: NORMAL

## 2023-05-15 ENCOUNTER — LAB REQUISITION (OUTPATIENT)
Dept: LAB | Facility: CLINIC | Age: 63
End: 2023-05-15
Payer: COMMERCIAL

## 2023-05-15 DIAGNOSIS — M00.9 PYOGENIC ARTHRITIS, UNSPECIFIED (H): ICD-10-CM

## 2023-05-15 LAB
AST SERPL W P-5'-P-CCNC: 34 U/L (ref 10–50)
BACTERIA SNV CULT: NORMAL
BASOPHILS # BLD AUTO: 0.1 10E3/UL (ref 0–0.2)
BASOPHILS NFR BLD AUTO: 1 %
CREAT SERPL-MCNC: 0.89 MG/DL (ref 0.67–1.17)
EOSINOPHIL # BLD AUTO: 0 10E3/UL (ref 0–0.7)
EOSINOPHIL NFR BLD AUTO: 0 %
ERYTHROCYTE [DISTWIDTH] IN BLOOD BY AUTOMATED COUNT: 13.8 % (ref 10–15)
GFR SERPL CREATININE-BSD FRML MDRD: >90 ML/MIN/1.73M2
HCT VFR BLD AUTO: 36.8 % (ref 40–53)
HGB BLD-MCNC: 12.2 G/DL (ref 13.3–17.7)
IMM GRANULOCYTES # BLD: 0 10E3/UL
IMM GRANULOCYTES NFR BLD: 0 %
LYMPHOCYTES # BLD AUTO: 2.1 10E3/UL (ref 0.8–5.3)
LYMPHOCYTES NFR BLD AUTO: 23 %
MCH RBC QN AUTO: 29.4 PG (ref 26.5–33)
MCHC RBC AUTO-ENTMCNC: 33.2 G/DL (ref 31.5–36.5)
MCV RBC AUTO: 89 FL (ref 78–100)
MONOCYTES # BLD AUTO: 0.9 10E3/UL (ref 0–1.3)
MONOCYTES NFR BLD AUTO: 10 %
NEUTROPHILS # BLD AUTO: 6.2 10E3/UL (ref 1.6–8.3)
NEUTROPHILS NFR BLD AUTO: 66 %
NRBC # BLD AUTO: 0 10E3/UL
NRBC BLD AUTO-RTO: 0 /100
PLATELET # BLD AUTO: 381 10E3/UL (ref 150–450)
RBC # BLD AUTO: 4.15 10E6/UL (ref 4.4–5.9)
WBC # BLD AUTO: 9.3 10E3/UL (ref 4–11)

## 2023-05-15 PROCEDURE — 85025 COMPLETE CBC W/AUTO DIFF WBC: CPT | Mod: ORL | Performed by: INTERNAL MEDICINE

## 2023-05-15 PROCEDURE — 36592 COLLECT BLOOD FROM PICC: CPT | Mod: ORL | Performed by: INTERNAL MEDICINE

## 2023-05-15 PROCEDURE — 82565 ASSAY OF CREATININE: CPT | Mod: ORL | Performed by: INTERNAL MEDICINE

## 2023-05-15 PROCEDURE — 84450 TRANSFERASE (AST) (SGOT): CPT | Mod: ORL | Performed by: INTERNAL MEDICINE

## 2023-05-17 LAB — BACTERIA SNV CULT: NORMAL

## 2023-06-07 ENCOUNTER — MEDICAL CORRESPONDENCE (OUTPATIENT)
Dept: HEALTH INFORMATION MANAGEMENT | Facility: CLINIC | Age: 63
End: 2023-06-07
Payer: COMMERCIAL

## 2023-06-16 ENCOUNTER — LAB (OUTPATIENT)
Dept: LAB | Facility: CLINIC | Age: 63
End: 2023-06-16
Payer: COMMERCIAL

## 2023-06-16 DIAGNOSIS — A49.8 PROPIONIBACTERIUM INFECTION: ICD-10-CM

## 2023-06-16 DIAGNOSIS — M71.012 ABSCESS OF BURSA OF LEFT SHOULDER: ICD-10-CM

## 2023-06-16 LAB
BASOPHILS # BLD AUTO: 0.1 10E3/UL (ref 0–0.2)
BASOPHILS NFR BLD AUTO: 1 %
CRP SERPL-MCNC: <3 MG/L
EOSINOPHIL # BLD AUTO: 0 10E3/UL (ref 0–0.7)
EOSINOPHIL NFR BLD AUTO: 0 %
ERYTHROCYTE [DISTWIDTH] IN BLOOD BY AUTOMATED COUNT: 13.1 % (ref 10–15)
ERYTHROCYTE [SEDIMENTATION RATE] IN BLOOD BY WESTERGREN METHOD: 2 MM/HR (ref 0–20)
HCT VFR BLD AUTO: 45.6 % (ref 40–53)
HGB BLD-MCNC: 14.6 G/DL (ref 13.3–17.7)
IMM GRANULOCYTES # BLD: 0 10E3/UL
IMM GRANULOCYTES NFR BLD: 0 %
LYMPHOCYTES # BLD AUTO: 2.4 10E3/UL (ref 0.8–5.3)
LYMPHOCYTES NFR BLD AUTO: 28 %
MCH RBC QN AUTO: 27.9 PG (ref 26.5–33)
MCHC RBC AUTO-ENTMCNC: 32 G/DL (ref 31.5–36.5)
MCV RBC AUTO: 87 FL (ref 78–100)
MONOCYTES # BLD AUTO: 0.8 10E3/UL (ref 0–1.3)
MONOCYTES NFR BLD AUTO: 9 %
NEUTROPHILS # BLD AUTO: 5.2 10E3/UL (ref 1.6–8.3)
NEUTROPHILS NFR BLD AUTO: 62 %
NRBC # BLD AUTO: 0 10E3/UL
NRBC BLD AUTO-RTO: 0 /100
PLATELET # BLD AUTO: 340 10E3/UL (ref 150–450)
RBC # BLD AUTO: 5.24 10E6/UL (ref 4.4–5.9)
WBC # BLD AUTO: 8.4 10E3/UL (ref 4–11)

## 2023-06-16 PROCEDURE — 85014 HEMATOCRIT: CPT

## 2023-06-16 PROCEDURE — 86140 C-REACTIVE PROTEIN: CPT

## 2023-06-16 PROCEDURE — 85652 RBC SED RATE AUTOMATED: CPT

## 2023-06-16 PROCEDURE — 36415 COLL VENOUS BLD VENIPUNCTURE: CPT

## 2023-08-12 ENCOUNTER — HEALTH MAINTENANCE LETTER (OUTPATIENT)
Age: 63
End: 2023-08-12

## 2024-05-18 ENCOUNTER — HEALTH MAINTENANCE LETTER (OUTPATIENT)
Age: 64
End: 2024-05-18

## 2025-06-08 ENCOUNTER — HEALTH MAINTENANCE LETTER (OUTPATIENT)
Age: 65
End: 2025-06-08

## (undated) DEVICE — GLOVE BIOGEL PI MICRO INDICATOR UNDERGLOVE SZ 7.5 48975

## (undated) DEVICE — TUBING SUCTION 12"X1/4" N612

## (undated) DEVICE — SOL NACL 0.9% IRRIG 3000ML BAG 2B7477

## (undated) DEVICE — MANIFOLD NEPTUNE 4 PORT 700-20

## (undated) DEVICE — SU VICRYL 2-0 CT-1 27" UND J259H

## (undated) DEVICE — SUCTION IRR SYSTEM W/O TIP INTERPULSE HANDPIECE 0210-100-000

## (undated) DEVICE — DRAPE U SPLIT 74X120" 29440

## (undated) DEVICE — APPLICATOR COTTON TIP 6"X2 STERILE LF 6012

## (undated) DEVICE — PREP CHLORAPREP 26ML TINTED HI-LITE ORANGE 930815

## (undated) DEVICE — SU MONOCRYL 2-0 CT-1 36" UND Y945H

## (undated) DEVICE — SOL WATER IRRIG 1000ML BOTTLE 2F7114

## (undated) DEVICE — BONE CLEANING TIP INTERPULSE  0210-010-000

## (undated) DEVICE — DRAPE SHEET REV FOLD 3/4 9349

## (undated) DEVICE — Device

## (undated) DEVICE — DRAIN ROUND W/RESERV KIT JACKSON PRATT 10FR 400ML SU130-402D

## (undated) DEVICE — SU MONOCRYL 0 CT-1 27" Y340H

## (undated) DEVICE — KIT CULTURE ESWAB AEROBE/ANAEROBE WHITE TOP R723480

## (undated) DEVICE — ESU GROUND PAD ADULT W/CORD E7507

## (undated) DEVICE — NDL SPINAL 18GA 3.5" 405184

## (undated) DEVICE — SU ETHILON 2-0 FS 18" 664G

## (undated) DEVICE — STPL SKIN 35W 6.9MM  PXW35

## (undated) DEVICE — GLOVE BIOGEL PI ULTRATOUCH SZ 7.5 41175

## (undated) DEVICE — PACK OPEN SHOULDER SOP15OCFSC

## (undated) DEVICE — DRAPE STOCKINETTE IMPERVIOUS 12" 1587

## (undated) DEVICE — DRSG XEROFORM 1X8"

## (undated) RX ORDER — FENTANYL CITRATE 50 UG/ML
INJECTION, SOLUTION INTRAMUSCULAR; INTRAVENOUS
Status: DISPENSED
Start: 2023-05-03

## (undated) RX ORDER — ONDANSETRON 2 MG/ML
INJECTION INTRAMUSCULAR; INTRAVENOUS
Status: DISPENSED
Start: 2023-05-03

## (undated) RX ORDER — GLYCOPYRROLATE 0.2 MG/ML
INJECTION, SOLUTION INTRAMUSCULAR; INTRAVENOUS
Status: DISPENSED
Start: 2023-05-03

## (undated) RX ORDER — LIDOCAINE HYDROCHLORIDE 10 MG/ML
INJECTION, SOLUTION EPIDURAL; INFILTRATION; INTRACAUDAL; PERINEURAL
Status: DISPENSED
Start: 2023-05-01

## (undated) RX ORDER — DEXAMETHASONE SODIUM PHOSPHATE 4 MG/ML
INJECTION, SOLUTION INTRA-ARTICULAR; INTRALESIONAL; INTRAMUSCULAR; INTRAVENOUS; SOFT TISSUE
Status: DISPENSED
Start: 2023-05-03

## (undated) RX ORDER — HYDROMORPHONE HYDROCHLORIDE 1 MG/ML
INJECTION, SOLUTION INTRAMUSCULAR; INTRAVENOUS; SUBCUTANEOUS
Status: DISPENSED
Start: 2023-05-03

## (undated) RX ORDER — FENTANYL CITRATE 0.05 MG/ML
INJECTION, SOLUTION INTRAMUSCULAR; INTRAVENOUS
Status: DISPENSED
Start: 2023-05-03